# Patient Record
Sex: FEMALE | Race: WHITE | Employment: FULL TIME | URBAN - METROPOLITAN AREA
[De-identification: names, ages, dates, MRNs, and addresses within clinical notes are randomized per-mention and may not be internally consistent; named-entity substitution may affect disease eponyms.]

---

## 2019-10-10 ENCOUNTER — OFFICE VISIT (OUTPATIENT)
Dept: ENT CLINIC | Age: 49
End: 2019-10-10
Payer: COMMERCIAL

## 2019-10-10 VITALS — HEART RATE: 74 BPM | SYSTOLIC BLOOD PRESSURE: 118 MMHG | OXYGEN SATURATION: 99 % | DIASTOLIC BLOOD PRESSURE: 68 MMHG

## 2019-10-10 DIAGNOSIS — R06.83 SNORING: Primary | ICD-10-CM

## 2019-10-10 DIAGNOSIS — R13.12 OROPHARYNGEAL DYSPHAGIA: ICD-10-CM

## 2019-10-10 DIAGNOSIS — H60.8X3 CHRONIC ECZEMATOUS OTITIS EXTERNA OF BOTH EARS: ICD-10-CM

## 2019-10-10 PROCEDURE — 99203 OFFICE O/P NEW LOW 30 MIN: CPT | Performed by: OTOLARYNGOLOGY

## 2019-10-10 RX ORDER — CITALOPRAM 40 MG/1
TABLET ORAL
Refills: 3 | COMMUNITY
Start: 2019-09-03

## 2019-10-10 RX ORDER — OMEPRAZOLE 10 MG/1
10 CAPSULE, DELAYED RELEASE ORAL PRN
COMMUNITY

## 2019-10-10 RX ORDER — BUPROPION HYDROCHLORIDE 150 MG/1
TABLET, EXTENDED RELEASE ORAL
Refills: 3 | COMMUNITY
Start: 2019-09-03

## 2019-10-10 RX ORDER — LOSARTAN POTASSIUM 50 MG/1
TABLET ORAL
Refills: 3 | COMMUNITY
Start: 2019-09-05

## 2022-10-03 ENCOUNTER — ANESTHESIA EVENT (OUTPATIENT)
Dept: OPERATING ROOM | Age: 52
End: 2022-10-03
Payer: COMMERCIAL

## 2022-10-03 RX ORDER — ESTRADIOL 0.5 MG/1
1 TABLET ORAL DAILY
COMMUNITY

## 2022-10-03 RX ORDER — CARVEDILOL 6.25 MG/1
6.25 TABLET ORAL 2 TIMES DAILY WITH MEALS
COMMUNITY

## 2022-10-03 NOTE — PROGRESS NOTES
Name_______________________________________Printed:____________________  Date and time of surgery____10/6/22  0730  [facility]____________________Arrival Time:____0600____________   1. The instructions given regarding when and if a patient needs to stop oral intake prior to surgery varies. Follow the specific instructions you were given                  _x__Nothing to eat or to drink after Midnight the night before.                   ____Carbo loading or ERAS instructions will be given to select patients-if you have been given those instructions -please do the following                           The evening before your surgery after dinner before midnight drink 40 ounces of gatorade. If you are diabetic use sugar free. The morning of surgery drink 40 ounces of water. This needs to be finished 3 hours prior to your surgery start time. 2. Take the following pills with a small sip of water on the morning of surgery: carvedilol, bupropion, citalopram                  Do not take blood pressure medications ending in pril or sartan the khurram prior to surgery or the morning of surgery_   3. Aspirin, Ibuprofen, Advil, Naproxen, Vitamin E and other Anti-inflammatory products and supplements should be stopped for 5 -7days before surgery or as directed by your physician. 4. Check with your Doctor regarding stopping Plavix, Coumadin,Eliquis, Lovenox,Effient,Pradaxa,Xarelto, Fragmin or other blood thinners and follow their instructions. 5. Do not smoke, and do not drink any alcoholic beverages 24 hours prior to surgery. This includes NA Beer. Refrain from the usage of any recreational drugs. 6. You may brush your teeth and gargle the morning of surgery. DO NOT SWALLOW WATER   7. You MUST make arrangements for a responsible adult to stay on site while you are here and take you home after your surgery. You will not be allowed to leave alone or drive yourself home. It is strongly suggested someone stay with you the first 24 hrs. Your surgery will be cancelled if you do not have a ride home. 8. A parent/legal guardian must accompany a child scheduled for surgery and plan to stay at the hospital until the child is discharged. Please do not bring other children with you. 9. Please wear simple, loose fitting clothing to the hospital.  Benjamine Si not bring valuables (money, credit cards, checkbooks, etc.) Do not wear any makeup (including no eye makeup) or nail polish on your fingers or toes. 10. DO NOT wear any jewelry or piercings on day of surgery. All body piercing jewelry must be removed. 11. If you have ___dentures, they will be removed before going to the OR; we will provide you a container. If you wear ___contact lenses or __x_glasses, they will be removed; please bring a case for them. 12. Please see your family doctor/pediatrician for a history & physical and/or concerning medications. Bring any test results/reports from your physician's office. PCP__________________Phone___________H&P Appt. Date________             13 If you  have a Living Will and Durable Power of  for Healthcare, please bring in a copy. 15. Notify your Surgeon if you develop any illness between now and surgery  time, cough, cold, fever, sore throat, nausea, vomiting, etc.  Please notify your surgeon if you experience dizziness, shortness of breath or blurred vision between now & the time of your surgery             15. DO NOT shave your operative site 96 hours prior to surgery. For face & neck surgery, men may use an electric razor 48 hours prior to surgery. 16. Shower the night before or morning of surgery using an antibacterial soap or as you have been instructed. 17. To provide excellent care visitors will be limited to one in the room at any given time. 18.  Please bring picture ID and insurance card.              19.  Visit our web site for additional information: [email or web address]              20.During flu season no children under the age of 15 are permitted in the hospital for the safety of all patients. 21. If you take a long acting insulin in the evening only  take half of your usual  dose the night  before your procedure              22. If you use a c-pap please bring DOS if staying overnight,             23.For your convenience [facility] has a pharmacy on site to fill your prescriptions. 24. If you use oxygen and have a portable tank please bring it  with you the DOS             25. Bring a complete list of all your medications with name and dose include any supplements. 26. Other__________________________________________   *Please call pre admission testing if you any further questions   [address]    [place] [address]. [facility]  [phone]   [address] [place] [address]       VISITOR POLICY(subject to change)    Current policy is 2 visitors per patient. No children. A mask is required. Visiting hours are 8a-8p. Overnight visitors will be at the discretion of the nurse. All above information reviewed with patient in person or by phone. Patient verbalizes understanding. All questions and concerns addressed.                                                                                                  Patient/Rep___patient_________________                                                                                                                                    PRE OP INSTRUCTIONS

## 2022-10-03 NOTE — PROGRESS NOTES
Case reviewed with Dr. Tyree Roldan and she requested cardiac clearance for patient. Called and spoke with Bob Vasquez at Dr. Victor M Fulton office and informed her of the need for cardiac clearance. She stated she would call the patient.

## 2022-10-05 NOTE — PROGRESS NOTES
MUNIRA harris at Dr. Clement Lewis office. She stated that the patient is at the cardiologist appointment [facility] and she will be calling the patient around 1430. She will then call us and let us know the outcome of the cardiology visit.

## 2022-10-06 ENCOUNTER — HOSPITAL ENCOUNTER (OUTPATIENT)
Age: 52
Setting detail: OUTPATIENT SURGERY
Discharge: HOME | End: 2022-10-06
Attending: PODIATRIST | Admitting: PODIATRIST
Payer: COMMERCIAL

## 2022-10-06 ENCOUNTER — ANESTHESIA (OUTPATIENT)
Dept: OPERATING ROOM | Age: 52
End: 2022-10-06
Payer: COMMERCIAL

## 2022-10-06 VITALS
TEMPERATURE: 98.1 F | HEIGHT: 61 IN | HEART RATE: 90 BPM | RESPIRATION RATE: 12 BRPM | BODY MASS INDEX: 38.33 KG/M2 | DIASTOLIC BLOOD PRESSURE: 92 MMHG | WEIGHT: 203 LBS | SYSTOLIC BLOOD PRESSURE: 141 MMHG | OXYGEN SATURATION: 93 %

## 2022-10-06 PROCEDURE — 3600000004 HC SURGERY LEVEL 4 BASE: Performed by: PODIATRIST

## 2022-10-06 PROCEDURE — 7100000000 HC PACU RECOVERY - FIRST 15 MIN: Performed by: PODIATRIST

## 2022-10-06 PROCEDURE — 6360000002 HC RX W HCPCS: Performed by: NURSE ANESTHETIST, CERTIFIED REGISTERED

## 2022-10-06 PROCEDURE — 64447 NJX AA&/STRD FEMORAL NRV IMG: CPT | Performed by: ANESTHESIOLOGY

## 2022-10-06 PROCEDURE — 2500000003 HC RX 250 WO HCPCS: Performed by: NURSE ANESTHETIST, CERTIFIED REGISTERED

## 2022-10-06 PROCEDURE — 6370000000 HC RX 637 (ALT 250 FOR IP): Performed by: ANESTHESIOLOGY

## 2022-10-06 PROCEDURE — 6360000002 HC RX W HCPCS: Performed by: ANESTHESIOLOGY

## 2022-10-06 PROCEDURE — 3600000014 HC SURGERY LEVEL 4 ADDTL 15MIN: Performed by: PODIATRIST

## 2022-10-06 PROCEDURE — 7100000010 HC PHASE II RECOVERY - FIRST 15 MIN: Performed by: PODIATRIST

## 2022-10-06 PROCEDURE — C1889 IMPLANT/INSERT DEVICE, NOC: HCPCS | Performed by: PODIATRIST

## 2022-10-06 PROCEDURE — C1769 GUIDE WIRE: HCPCS | Performed by: PODIATRIST

## 2022-10-06 PROCEDURE — 6360000002 HC RX W HCPCS

## 2022-10-06 PROCEDURE — C1713 ANCHOR/SCREW BN/BN,TIS/BN: HCPCS | Performed by: PODIATRIST

## 2022-10-06 PROCEDURE — 2720000010 HC SURG SUPPLY STERILE: Performed by: PODIATRIST

## 2022-10-06 PROCEDURE — 2580000003 HC RX 258: Performed by: NURSE ANESTHETIST, CERTIFIED REGISTERED

## 2022-10-06 PROCEDURE — C1776 JOINT DEVICE (IMPLANTABLE): HCPCS | Performed by: PODIATRIST

## 2022-10-06 PROCEDURE — 64445 NJX AA&/STRD SCIATIC NRV IMG: CPT | Performed by: ANESTHESIOLOGY

## 2022-10-06 PROCEDURE — 6370000000 HC RX 637 (ALT 250 FOR IP): Performed by: PODIATRIST

## 2022-10-06 PROCEDURE — 2580000003 HC RX 258: Performed by: PODIATRIST

## 2022-10-06 PROCEDURE — 2709999900 HC NON-CHARGEABLE SUPPLY: Performed by: PODIATRIST

## 2022-10-06 PROCEDURE — 3700000000 HC ANESTHESIA ATTENDED CARE: Performed by: PODIATRIST

## 2022-10-06 PROCEDURE — 7100000001 HC PACU RECOVERY - ADDTL 15 MIN: Performed by: PODIATRIST

## 2022-10-06 PROCEDURE — 3700000001 HC ADD 15 MINUTES (ANESTHESIA): Performed by: PODIATRIST

## 2022-10-06 PROCEDURE — 6360000002 HC RX W HCPCS: Performed by: PODIATRIST

## 2022-10-06 PROCEDURE — 2500000003 HC RX 250 WO HCPCS: Performed by: ANESTHESIOLOGY

## 2022-10-06 PROCEDURE — 7100000011 HC PHASE II RECOVERY - ADDTL 15 MIN: Performed by: PODIATRIST

## 2022-10-06 DEVICE — POLYAXIAL LOCKING PLATE -  LAPIDUS CROSS-PLATE, RIGHT (T10)
Type: IMPLANTABLE DEVICE | Site: FOOT | Status: FUNCTIONAL
Brand: ANCHORAGE

## 2022-10-06 DEVICE — SONICANCHOR KIT
Type: IMPLANTABLE DEVICE | Site: ANKLE | Status: FUNCTIONAL
Brand: SONICANCHOR

## 2022-10-06 DEVICE — LOCKING SCREW
Type: IMPLANTABLE DEVICE | Site: ANKLE | Status: FUNCTIONAL
Brand: VARIAX

## 2022-10-06 DEVICE — CANNULATED SCREW - 16MM THREAD
Type: IMPLANTABLE DEVICE | Site: FOOT | Status: FUNCTIONAL
Brand: AXSOS 3

## 2022-10-06 DEVICE — HAMMERTOE IMPLANT, SMALL
Type: IMPLANTABLE DEVICE | Site: FOOT | Status: FUNCTIONAL
Brand: TOETAC

## 2022-10-06 DEVICE — GRAFT HUM TISS W2XL4CM THK0.1-0.2MM AMNIO MEM 2 LAYR: Type: IMPLANTABLE DEVICE | Site: FOOT | Status: FUNCTIONAL

## 2022-10-06 DEVICE — IMPLANTABLE DEVICE
Type: IMPLANTABLE DEVICE | Site: ANKLE | Status: FUNCTIONAL
Brand: GRAVITY

## 2022-10-06 DEVICE — INJECTABLE KIT
Type: IMPLANTABLE DEVICE | Site: ANKLE | Status: FUNCTIONAL
Brand: AUGMENT® INJECTABLE

## 2022-10-06 DEVICE — BIOACTIVE BONE GRAFT SUBSTITUTE, FOAM PACK; BETA-TRICALCIUM PHOSPHATE, TYPE I BOVINE COLLAGEN, AND BIOACTIVE GLASS
Type: IMPLANTABLE DEVICE | Site: ANKLE | Status: FUNCTIONAL
Brand: VITOSS BBTRAUMA

## 2022-10-06 DEVICE — CP LAG SCREW (T10)
Type: IMPLANTABLE DEVICE | Site: FOOT | Status: FUNCTIONAL
Brand: ANCHORAGE

## 2022-10-06 RX ORDER — HYDROMORPHONE HCL 110MG/55ML
PATIENT CONTROLLED ANALGESIA SYRINGE INTRAVENOUS PRN
Status: DISCONTINUED | OUTPATIENT
Start: 2022-10-06 | End: 2022-10-06 | Stop reason: SDUPTHER

## 2022-10-06 RX ORDER — LIDOCAINE HYDROCHLORIDE 10 MG/ML
0.5 INJECTION, SOLUTION EPIDURAL; INFILTRATION; INTRACAUDAL; PERINEURAL ONCE
Status: DISCONTINUED | OUTPATIENT
Start: 2022-10-06 | End: 2022-10-06 | Stop reason: HOSPADM

## 2022-10-06 RX ORDER — ROCURONIUM BROMIDE 10 MG/ML
INJECTION, SOLUTION INTRAVENOUS PRN
Status: DISCONTINUED | OUTPATIENT
Start: 2022-10-06 | End: 2022-10-06 | Stop reason: SDUPTHER

## 2022-10-06 RX ORDER — SUCCINYLCHOLINE/SOD CL,ISO/PF 200MG/10ML
SYRINGE (ML) INTRAVENOUS PRN
Status: DISCONTINUED | OUTPATIENT
Start: 2022-10-06 | End: 2022-10-06 | Stop reason: SDUPTHER

## 2022-10-06 RX ORDER — BUPIVACAINE HYDROCHLORIDE 5 MG/ML
INJECTION, SOLUTION EPIDURAL; INTRACAUDAL
Status: COMPLETED | OUTPATIENT
Start: 2022-10-06 | End: 2022-10-06

## 2022-10-06 RX ORDER — OXYCODONE HYDROCHLORIDE 5 MG/1
5 TABLET ORAL
Status: COMPLETED | OUTPATIENT
Start: 2022-10-06 | End: 2022-10-06

## 2022-10-06 RX ORDER — MIDAZOLAM HYDROCHLORIDE 1 MG/ML
INJECTION INTRAMUSCULAR; INTRAVENOUS PRN
Status: DISCONTINUED | OUTPATIENT
Start: 2022-10-06 | End: 2022-10-06 | Stop reason: SDUPTHER

## 2022-10-06 RX ORDER — PROPOFOL 10 MG/ML
INJECTION, EMULSION INTRAVENOUS PRN
Status: DISCONTINUED | OUTPATIENT
Start: 2022-10-06 | End: 2022-10-06 | Stop reason: SDUPTHER

## 2022-10-06 RX ORDER — EPHEDRINE SULFATE/0.9% NACL/PF 50 MG/5 ML
SYRINGE (ML) INTRAVENOUS PRN
Status: DISCONTINUED | OUTPATIENT
Start: 2022-10-06 | End: 2022-10-06 | Stop reason: SDUPTHER

## 2022-10-06 RX ORDER — PROCHLORPERAZINE EDISYLATE 5 MG/ML
5 INJECTION INTRAMUSCULAR; INTRAVENOUS
Status: DISCONTINUED | OUTPATIENT
Start: 2022-10-06 | End: 2022-10-06 | Stop reason: HOSPADM

## 2022-10-06 RX ORDER — BUPIVACAINE HYDROCHLORIDE AND EPINEPHRINE 5; 5 MG/ML; UG/ML
INJECTION, SOLUTION PERINEURAL
Status: COMPLETED | OUTPATIENT
Start: 2022-10-06 | End: 2022-10-06

## 2022-10-06 RX ORDER — ONDANSETRON 2 MG/ML
4 INJECTION INTRAMUSCULAR; INTRAVENOUS
Status: DISCONTINUED | OUTPATIENT
Start: 2022-10-06 | End: 2022-10-06 | Stop reason: HOSPADM

## 2022-10-06 RX ORDER — CEFAZOLIN SODIUM 1 G/3ML
INJECTION, POWDER, FOR SOLUTION INTRAMUSCULAR; INTRAVENOUS PRN
Status: DISCONTINUED | OUTPATIENT
Start: 2022-10-06 | End: 2022-10-06 | Stop reason: SDUPTHER

## 2022-10-06 RX ORDER — MIDAZOLAM HYDROCHLORIDE 2 MG/2ML
2 INJECTION, SOLUTION INTRAMUSCULAR; INTRAVENOUS ONCE
Status: COMPLETED | OUTPATIENT
Start: 2022-10-06 | End: 2022-10-06

## 2022-10-06 RX ORDER — FENTANYL CITRATE 50 UG/ML
25 INJECTION, SOLUTION INTRAMUSCULAR; INTRAVENOUS EVERY 5 MIN PRN
Status: DISCONTINUED | OUTPATIENT
Start: 2022-10-06 | End: 2022-10-06 | Stop reason: HOSPADM

## 2022-10-06 RX ORDER — SODIUM CHLORIDE, SODIUM LACTATE, POTASSIUM CHLORIDE, CALCIUM CHLORIDE 600; 310; 30; 20 MG/100ML; MG/100ML; MG/100ML; MG/100ML
INJECTION, SOLUTION INTRAVENOUS CONTINUOUS
Status: DISCONTINUED | OUTPATIENT
Start: 2022-10-06 | End: 2022-10-06 | Stop reason: HOSPADM

## 2022-10-06 RX ORDER — ONDANSETRON 2 MG/ML
INJECTION INTRAMUSCULAR; INTRAVENOUS PRN
Status: DISCONTINUED | OUTPATIENT
Start: 2022-10-06 | End: 2022-10-06 | Stop reason: SDUPTHER

## 2022-10-06 RX ORDER — SCOLOPAMINE TRANSDERMAL SYSTEM 1 MG/1
1 PATCH, EXTENDED RELEASE TRANSDERMAL ONCE
Status: DISCONTINUED | OUTPATIENT
Start: 2022-10-06 | End: 2022-10-06 | Stop reason: HOSPADM

## 2022-10-06 RX ORDER — LABETALOL HYDROCHLORIDE 5 MG/ML
10 INJECTION, SOLUTION INTRAVENOUS
Status: DISCONTINUED | OUTPATIENT
Start: 2022-10-06 | End: 2022-10-06 | Stop reason: HOSPADM

## 2022-10-06 RX ORDER — SODIUM CHLORIDE 9 MG/ML
INJECTION, SOLUTION INTRAVENOUS CONTINUOUS
Status: DISCONTINUED | OUTPATIENT
Start: 2022-10-06 | End: 2022-10-06 | Stop reason: HOSPADM

## 2022-10-06 RX ORDER — BACITRACIN ZINC AND POLYMYXIN B SULFATE 500; 1000 [USP'U]/G; [USP'U]/G
OINTMENT TOPICAL
Status: COMPLETED | OUTPATIENT
Start: 2022-10-06 | End: 2022-10-06

## 2022-10-06 RX ORDER — FENTANYL CITRATE 50 UG/ML
100 INJECTION, SOLUTION INTRAMUSCULAR; INTRAVENOUS ONCE
Status: COMPLETED | OUTPATIENT
Start: 2022-10-06 | End: 2022-10-06

## 2022-10-06 RX ORDER — HYDRALAZINE HYDROCHLORIDE 20 MG/ML
10 INJECTION INTRAMUSCULAR; INTRAVENOUS
Status: DISCONTINUED | OUTPATIENT
Start: 2022-10-06 | End: 2022-10-06 | Stop reason: HOSPADM

## 2022-10-06 RX ORDER — TETRACAINE HYDROCHLORIDE 5 MG/ML
2 SOLUTION OPHTHALMIC ONCE
Status: COMPLETED | OUTPATIENT
Start: 2022-10-06 | End: 2022-10-06

## 2022-10-06 RX ORDER — DEXAMETHASONE SODIUM PHOSPHATE 4 MG/ML
INJECTION, SOLUTION INTRA-ARTICULAR; INTRALESIONAL; INTRAMUSCULAR; INTRAVENOUS; SOFT TISSUE PRN
Status: DISCONTINUED | OUTPATIENT
Start: 2022-10-06 | End: 2022-10-06 | Stop reason: SDUPTHER

## 2022-10-06 RX ORDER — FENTANYL CITRATE 50 UG/ML
INJECTION, SOLUTION INTRAMUSCULAR; INTRAVENOUS
Status: COMPLETED
Start: 2022-10-06 | End: 2022-10-06

## 2022-10-06 RX ORDER — SODIUM CHLORIDE, SODIUM LACTATE, POTASSIUM CHLORIDE, CALCIUM CHLORIDE 600; 310; 30; 20 MG/100ML; MG/100ML; MG/100ML; MG/100ML
INJECTION, SOLUTION INTRAVENOUS CONTINUOUS PRN
Status: DISCONTINUED | OUTPATIENT
Start: 2022-10-06 | End: 2022-10-06 | Stop reason: SDUPTHER

## 2022-10-06 RX ORDER — HYDROMORPHONE HCL 110MG/55ML
0.5 PATIENT CONTROLLED ANALGESIA SYRINGE INTRAVENOUS EVERY 5 MIN PRN
Status: DISCONTINUED | OUTPATIENT
Start: 2022-10-06 | End: 2022-10-06 | Stop reason: HOSPADM

## 2022-10-06 RX ORDER — FENTANYL CITRATE 50 UG/ML
INJECTION, SOLUTION INTRAMUSCULAR; INTRAVENOUS PRN
Status: DISCONTINUED | OUTPATIENT
Start: 2022-10-06 | End: 2022-10-06 | Stop reason: SDUPTHER

## 2022-10-06 RX ORDER — MINERAL OIL AND WHITE PETROLATUM 150; 830 MG/G; MG/G
OINTMENT OPHTHALMIC PRN
Status: DISCONTINUED | OUTPATIENT
Start: 2022-10-06 | End: 2022-10-06 | Stop reason: HOSPADM

## 2022-10-06 RX ORDER — LIDOCAINE HYDROCHLORIDE 10 MG/ML
1 INJECTION, SOLUTION EPIDURAL; INFILTRATION; INTRACAUDAL; PERINEURAL
Status: DISCONTINUED | OUTPATIENT
Start: 2022-10-06 | End: 2022-10-06 | Stop reason: HOSPADM

## 2022-10-06 RX ORDER — POLYVINYL ALCOHOL 14 MG/ML
1 SOLUTION/ DROPS OPHTHALMIC PRN
Status: DISCONTINUED | OUTPATIENT
Start: 2022-10-06 | End: 2022-10-06 | Stop reason: HOSPADM

## 2022-10-06 RX ADMIN — SODIUM CHLORIDE, POTASSIUM CHLORIDE, SODIUM LACTATE AND CALCIUM CHLORIDE: 600; 310; 30; 20 INJECTION, SOLUTION INTRAVENOUS at 07:14

## 2022-10-06 RX ADMIN — HYDROMORPHONE HYDROCHLORIDE 0.5 MG: 2 INJECTION, SOLUTION INTRAMUSCULAR; INTRAVENOUS; SUBCUTANEOUS at 12:08

## 2022-10-06 RX ADMIN — CEFAZOLIN 2 G: 1 INJECTION, POWDER, FOR SOLUTION INTRAVENOUS at 11:42

## 2022-10-06 RX ADMIN — PROPOFOL 200 MG: 10 INJECTION, EMULSION INTRAVENOUS at 07:56

## 2022-10-06 RX ADMIN — FENTANYL CITRATE 50 MCG: 50 INJECTION, SOLUTION INTRAMUSCULAR; INTRAVENOUS at 07:34

## 2022-10-06 RX ADMIN — SUGAMMADEX 200 MG: 100 INJECTION, SOLUTION INTRAVENOUS at 11:57

## 2022-10-06 RX ADMIN — SODIUM CHLORIDE, POTASSIUM CHLORIDE, SODIUM LACTATE AND CALCIUM CHLORIDE: 600; 310; 30; 20 INJECTION, SOLUTION INTRAVENOUS at 07:10

## 2022-10-06 RX ADMIN — BUPIVACAINE HYDROCHLORIDE 20 ML: 5 INJECTION, SOLUTION EPIDURAL; INTRACAUDAL at 07:27

## 2022-10-06 RX ADMIN — PROPOFOL 50 MG: 10 INJECTION, EMULSION INTRAVENOUS at 12:40

## 2022-10-06 RX ADMIN — DEXAMETHASONE SODIUM PHOSPHATE 8 MG: 4 INJECTION, SOLUTION INTRAMUSCULAR; INTRAVENOUS at 07:56

## 2022-10-06 RX ADMIN — MIDAZOLAM HYDROCHLORIDE 1 MG: 1 INJECTION, SOLUTION INTRAMUSCULAR; INTRAVENOUS at 07:15

## 2022-10-06 RX ADMIN — HYDROMORPHONE HYDROCHLORIDE 0.5 MG: 2 INJECTION, SOLUTION INTRAMUSCULAR; INTRAVENOUS; SUBCUTANEOUS at 13:10

## 2022-10-06 RX ADMIN — ONDANSETRON 4 MG: 2 INJECTION INTRAMUSCULAR; INTRAVENOUS at 07:56

## 2022-10-06 RX ADMIN — ONDANSETRON 4 MG: 2 INJECTION INTRAMUSCULAR; INTRAVENOUS at 12:45

## 2022-10-06 RX ADMIN — Medication 120 MG: at 07:56

## 2022-10-06 RX ADMIN — ROCURONIUM BROMIDE 30 MG: 10 INJECTION, SOLUTION INTRAVENOUS at 08:47

## 2022-10-06 RX ADMIN — FENTANYL CITRATE 25 MCG: 0.05 INJECTION, SOLUTION INTRAMUSCULAR; INTRAVENOUS at 13:42

## 2022-10-06 RX ADMIN — FENTANYL CITRATE 50 MCG: 50 INJECTION, SOLUTION INTRAMUSCULAR; INTRAVENOUS at 07:55

## 2022-10-06 RX ADMIN — Medication 10 MG: at 08:19

## 2022-10-06 RX ADMIN — PROPOFOL 50 MG: 10 INJECTION, EMULSION INTRAVENOUS at 08:47

## 2022-10-06 RX ADMIN — MIDAZOLAM 1 MG: 1 INJECTION INTRAMUSCULAR; INTRAVENOUS at 07:52

## 2022-10-06 RX ADMIN — HYDROMORPHONE HYDROCHLORIDE 0.5 MG: 2 INJECTION, SOLUTION INTRAMUSCULAR; INTRAVENOUS; SUBCUTANEOUS at 12:30

## 2022-10-06 RX ADMIN — ROCURONIUM BROMIDE 20 MG: 10 INJECTION, SOLUTION INTRAVENOUS at 09:20

## 2022-10-06 RX ADMIN — HYDROMORPHONE HYDROCHLORIDE 0.5 MG: 2 INJECTION, SOLUTION INTRAMUSCULAR; INTRAVENOUS; SUBCUTANEOUS at 12:52

## 2022-10-06 RX ADMIN — FENTANYL CITRATE 50 MCG: 50 INJECTION, SOLUTION INTRAMUSCULAR; INTRAVENOUS at 08:58

## 2022-10-06 RX ADMIN — FENTANYL CITRATE 25 MCG: 0.05 INJECTION, SOLUTION INTRAMUSCULAR; INTRAVENOUS at 13:52

## 2022-10-06 RX ADMIN — BUPIVACAINE HYDROCHLORIDE 20 ML: 5 INJECTION, SOLUTION EPIDURAL; INTRACAUDAL at 07:22

## 2022-10-06 RX ADMIN — OXYCODONE 5 MG: 5 TABLET ORAL at 14:14

## 2022-10-06 RX ADMIN — MIDAZOLAM 1 MG: 1 INJECTION INTRAMUSCULAR; INTRAVENOUS at 07:49

## 2022-10-06 RX ADMIN — TETRACAINE HYDROCHLORIDE 2 DROP: 5 SOLUTION OPHTHALMIC at 13:59

## 2022-10-06 RX ADMIN — CEFAZOLIN 2000 MG: 2 INJECTION, POWDER, FOR SOLUTION INTRAMUSCULAR; INTRAVENOUS at 07:42

## 2022-10-06 ASSESSMENT — PAIN DESCRIPTION - DESCRIPTORS
DESCRIPTORS: ACHING

## 2022-10-06 ASSESSMENT — PAIN DESCRIPTION - LOCATION
LOCATION: FOOT

## 2022-10-06 ASSESSMENT — PAIN DESCRIPTION - ORIENTATION
ORIENTATION: RIGHT

## 2022-10-06 ASSESSMENT — PAIN SCALES - GENERAL
PAINLEVEL_OUTOF10: 0
PAINLEVEL_OUTOF10: 3
PAINLEVEL_OUTOF10: 8
PAINLEVEL_OUTOF10: 0
PAINLEVEL_OUTOF10: 7
PAINLEVEL_OUTOF10: 6

## 2022-10-06 ASSESSMENT — ENCOUNTER SYMPTOMS: SHORTNESS OF BREATH: 0

## 2022-10-06 ASSESSMENT — PAIN - FUNCTIONAL ASSESSMENT
PAIN_FUNCTIONAL_ASSESSMENT: ACTIVITIES ARE NOT PREVENTED
PAIN_FUNCTIONAL_ASSESSMENT: 0-10

## 2022-10-06 NOTE — PROGRESS NOTES
Pt has c/o right eye pain/watering.   Dr. Verner Dilling at bedside to assess right eye-orders received/initiated

## 2022-10-06 NOTE — BRIEF OP NOTE
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]; Posterior tibial tendinitis of right leg [M76.821]; Hallux valgus, right [M20.11]; Metatarsalgia, right foot [M77.41]; Dislocation Metatarsophalangeal joint- 2nd digit, right foot [S93.124D]; Contracture of Joint- right foot [M24.574]; Hammertoe- 2nd digit, right foot [M20.42]    Post-Op Diagnosis: Same       Procedure(s):  39981- Common Peroneal Neuroplasty- right ankle/foot;  44551- Subtalar Joint Arthrodesis- right ankle/foot;  84356- Weil Osteotomy- right ankle/foot;  22921- Kidner Procedure- right ankle/foot;  43686- Lapidus- right foot;  54283- Open Repair of Closed Dislocation of Metatarsophalangeal joint- 2nd digit, right foot;  62232- Hammertoe Correction- 2nd digit, right foot;  62986- Flexor Tenotomy of the Distal Interphalangeal Joint- 3rd digit, right foot  61447- Application Below Knee Splint- right lower limb    Surgeon(s):  Jesus Hamilton DPM    Assistant:  Jyoti Zeng DPM, PGY-3  Jose Eduardo Chinchilla, MS-4    Anesthesia: General with popliteal/saphenous nerve block    Hemostasis: Anatomic dissection and electrocautery    Injectables: 10 cc of 0.5% marcaine with epinephrine preoperatively    Materials: 3-0 vicryl, 4-0 vicryl, 5-0 vicryl, 3-0 nylon, 4-0 nylon, 2-0 Xbraid suture    Implants:   Code Fever Axsos 3  6.5x85mm cannulated screw x1  6.5x80mm cannulated screw x1  [employer] [facility] [place] 2 CP  Lapidus CP plate right x1  5.3E29MB locking screw x2  3.5x12mm locking screw x2  4. 1x40mm lag screw x1  Code Fever [place] Edge Screw 2x12mm x1  [place] [facility] Sonic [place] 2.5x10mm x1  [facility] [place] [facility] [place] Plantar plate repair set x1  [employer] Toe Tac Xpress Small x1  [facility] AlloWrap DS Wet 2x4cm x1  [employer] Augment injectable   [employer] Vitoss Bone substitute     Estimated Blood Loss (mL): 716     Complications: None    Specimens:   * No specimens in log *    Implants:  Implant Name Type Inv.  Item Serial No.  Lot No. LRB No. Used Action   GRAFT BNE SUB 5CC B TRICALCIUM PHSPTE VERSATILE ULT POR - [id number]  GRAFT BNE SUB 5CC B TRICALCIUM PHSPTE VERSATILE ULT POR  [employer] [facility] [id number] Right 1 Implanted   SCREW BNE DIA3MM PLNTR GRAV - [id number]  SCREW BNE DIA3MM PLNTR [facility] [employer] [facility] [facility] [id number] Right 1 Implanted   ANCHOR KIT 2.5X10 MM FORBerst FIBER SONIC - [id number]  ANCHOR KIT 2.5X10 MM FORC FIBER SONIC  [email or web address] [facility]- [id number] Right 2 Implanted   GRAFT BONE TIB INJ 1.5CC ALLOGRFT AUGMENT - [id number]  GRAFT BONE TIB INJ 1.5CC ALLOGRFT AUGMENT  [facility] [id number] Right 1 Implanted   GRAFT BNE SUB 3CC RHPDGF BB B TRICALCIUM PHSPTE RADPQ AUG - [id number]  GRAFT BNE SUB 3CC RHPDGF BB B TRICALCIUM PHSPTE RADPQ AUG  [facility] [employer] [facility] [id number] Right 1 Implanted   GRAFT BNE SUB 3CC RHPDGF BB B TRICALCIUM PHSPTE RADPQ AUG - [id number]  GRAFT BNE SUB 3CC RHPDGF BB B TRICALCIUM PHSPTE RADPQ AUG  [facility] [employer] [facility] [id number] Right 1 Implanted   GRAFT BNE SUB 3CC RHPDGF BB B TRICALCIUM PHSPTE RADPQ AUG - [id number]  GRAFT BNE SUB 3CC RHPDGF BB B TRICALCIUM PHSPTE RADPQ AUG  [facility] [id number] Right 1 Implanted   IMPLANT TOE JT SM FOR HAMRTOE FIX SYS TOETAC - [id number]  IMPLANT TOE JT SM FOR HAMRTOE FIX SYS TOETAC  [employer] [facility] [id number] Right 1 Implanted   DARTFIRE EDGE SCREW    [employer]_COV [id number] Right 1 Implanted   SCREW BNE LAXMI 6.5X80 MM 16 MM THRD AXSOS 3 - [id number]  SCREW BNE LAXMI 6.5X80 MM 16 MM THRD AXSOS 3  [employer] HOWM-WD  Right 1 Implanted   SCREW BNE LAXMI 6.5X85 MM 16 MM THRD AXSOS 3 - [id number]  SCREW BNE LAXMI 6.5X85 MM 16 MM THRD AXSOS 3  [employer] [facility]  Right 1 Implanted   PLATE BNE R LAPIDUS CROSSPLT POLYAX YURIDIA T10 ANCHORAGE 2 - [id number]  PLATE BNE R LAPIDUS CROSSPLT POLYAX YURIDIA T10 ANCHORAGE 2  [employer] [facility]  Right 1 Implanted   GRAFT HUM TISS [id number] THK0.1-0.2MM AMNIO MEM 2 LAYR - [id number]  GRAFT HUM TISS N1ID2ON THK0.1-0.2MM AMNIO MEM 2 LAYR  [employer] [facility] [id number] Right 1 Implanted   SCREW CP LAG 4.1X40MM T10 - [id number]  SCREW CP LAG 4.1X40MM T10  [employer] [facility]  Right 1 Implanted   PLATE BNE R LAPIDUS CROSSPLT POLYAX YURIDIA T10 ANCHORAGE 2 - [id number]  PLATE BNE R LAPIDUS CROSSPLT POLYAX YURIDIA T10 ANCHORAGE 2  [employer] [facility]  Right 1 Implanted   SCREW BNE L12MM DIA3.5MM CANC TI YURIDIA FULL THRD T10 DRV FOR - [id number]  SCREW BNE L12MM DIA3.5MM CANC TI YURIDIA FULL THRD T10 DRV FOR  [employer] [facility]  Right 2 Implanted   SCREW BNE L16MM DIA3.5MM SNOW TI YURIDIA FULL THRD T10 DRV FOR - [id number]  SCREW BNE L16MM DIA3.5MM SNOW TI YURIDIA FULL THRD T10 DRV FOR  [employer] [facility]  Right 2 Implanted   DARTFIRE EDGE SCREW    [employer]_COV [id number] Right 1 Implanted         Drains: * No LDAs found *    Findings: see op report    Electronically signed by Sumanth Ramos DPM on 10/6/2022 at 1:05 PM

## 2022-10-06 NOTE — PROGRESS NOTES
Discharge instructions review with patient and daughter. All home medications have been reviewed, pt v/u. Discharge instructions signed. Pt discharged via wheelchair. Pt discharged with discharge paperwork, ice pack, xerelto prescription and all belongings. Daughter taking stable pt home.

## 2022-10-06 NOTE — OP NOTE
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]; Posterior tibial tendinitis of right leg [M76.821]; Hallux valgus, right [M20.11]; Metatarsalgia, right foot [M77.41]; Dislocation Metatarsophalangeal joint- 2nd digit, right foot [S93.124D]; Contracture of Joint- right foot [M24.574]; Hammertoe- 2nd digit, right foot [M20.42]     Post-Op Diagnosis: Same       Procedure(s):  12620- Common Peroneal Neuroplasty- right ankle/foot;  04066- Subtalar Joint Arthrodesis- right ankle/foot;  02843- Weil Osteotomy- right ankle/foot;  35761- Kidner Procedure- right ankle/foot;  41004- Lapidus- right foot;  90778- Open Repair of Closed Dislocation of Metatarsophalangeal joint- 2nd digit, right foot;  88753- Hammertoe Correction- 2nd digit, right foot;  90217- Flexor Tenotomy of the Distal Interphalangeal Joint- 3rd digit, right foot  63658- Application Below Knee Splint- right lower limb     Surgeon(s):  Keon Rosen DPM     Assistant:  Michael Kelley DPM, PGY-3  Cheryl Art, MS-4     Anesthesia: General with popliteal/saphenous nerve block     Hemostasis: Anatomic dissection and electrocautery     Injectables: 10 cc of 0.5% marcaine with epinephrine preoperatively     Materials: 3-0 vicryl, 4-0 vicryl, 5-0 vicryl, 3-0 nylon, 4-0 nylon, 2-0 Xbraid suture     Implants:   [employer] Axsos 3  6.5x85mm cannulated screw x1  6.5x80mm cannulated screw x1  [employer] [facility] [place] 2 CP  Lapidus CP plate right x1  8.7S81VZ locking screw x2  3.5x12mm locking screw x2  4. 1x40mm lag screw x1  [employer] Dartfire Edge Screw 2x12mm x1  [facility] Sonic [place] 2.5x10mm x1  [place] [facility] Comerio Plantar plate repair set x1  [place] [facility] Toe Tac Xpress Small x1  [employer] AlloWrap DS Wet 2x4cm x1  [employer]ick[employer] Augment injectable   [employer]ickie [employer] Vitoss Bone substitute      Estimated Blood Loss (mL): 784      Complications: None    Specimens:   * No specimens in log *    Implants:  Implant Name Type Inv.  Item Serial No.  Lot No. LRB No. Used Action   GRAFT BNE SUB 5CC B TRICALCIUM PHSPTE VERSATILE ULT POR - [id number]  GRAFT BNE SUB 5CC B TRICALCIUM PHSPTE VERSATILE ULT POR  [employer] [facility] [id number] Right 1 Implanted   SCREW BNE DIA3MM PLNTR [facility] - [id number]  SCREW BNE DIA3MM PLNTR [facility] [employer] [facility] [id number] Right 1 Implanted   ANCHOR KIT 2.5X10 MM [employer] FIBER SONIC - [id number]  ANCHOR KIT 2.5X10 MM FORC FIBER SONIC  [employer] MARIAH- [id number] Right 2 Implanted   GRAFT BONE TIB INJ 1.5CC ALLOGRFT AUGMENT - [id number]  GRAFT BONE TIB INJ 1.5CC ALLOGRFT AUGMENT  [employer] [facility] [id number] Right 1 Implanted   GRAFT BNE SUB 3CC RHPDGF BB B TRICALCIUM PHSPTE RADPQ AUG - [id number]  GRAFT BNE SUB 3CC RHPDGF BB B TRICALCIUM PHSPTE RADPQ AUG  [facility] [id number] Right 1 Implanted   GRAFT BNE SUB 3CC RHPDGF BB B TRICALCIUM PHSPTE RADPQ AUG - [id number]  GRAFT BNE SUB 3CC RHPDGF BB B TRICALCIUM PHSPTE RADPQ AUG  [facility] [id number] Right 1 Implanted   GRAFT BNE SUB 3CC RHPDGF BB B TRICALCIUM PHSPTE RADPQ AUG - [id number]  GRAFT BNE SUB 3CC RHPDGF BB B TRICALCIUM PHSPTE RADPQ AUG  [facility] [id number] Right 1 Implanted   IMPLANT TOE JT SM FOR HAMRTOE FIX SYS TOETAC - [id number]  IMPLANT TOE JT SM FOR HAMRTOE FIX SYS TOETAC  [employer] [facility] [id number] Right 1 Implanted   DARTFIRE EDGE SCREW    [employer] [id number] Right 1 Implanted   SCREW BNE LAXMI 6.5X80 MM 16 MM THRD AXSOS 3 - [id number]  SCREW BNE LAXMI 6.5X80 MM 16 MM THRD AXSOS 3  [employer] HOWM-WD  Right 1 Implanted   SCREW BNE LAXMI 6.5X85 MM 16 MM THRD AXSOS 3 - [id number]  SCREW BNE LAXMI 6.5X85 MM 16 MM THRD AXSOS 3  [employer] [facility]  Right 1 Implanted   PLATE BNE R LAPIDUS CROSSPLT POLYAX YURIDIA T10 ANCHORAGE 2 - [id number]  PLATE BNE R LAPIDUS CROSSPLT POLYAX YURIDIA T10 ANCHORAGE 2  [employer] [facility]  Right 1 Implanted   GRAFT HUM TISS [id number] THK0.1-0.2MM AMNIO MEM 2 LAYR - [id number]  GRAFT HUM TISS [id number] THK0.1-0.2MM AMNIO MEM 2 LAYR  [employer] [facility] [id number] Right 1 Implanted   SCREW CP LAG 4.1X40MM T10 - [id number]  SCREW CP LAG 4.1X40MM T10  [employer] [facility]  Right 1 Implanted   PLATE BNE R LAPIDUS CROSSPLT POLYAX YURIDIA T10 ANCHORAGE 2 - [id number]  PLATE BNE R LAPIDUS CROSSPLT POLYAX YRUIDIA T10 ANCHORAGE 2  [employer] [facility]  Right 1 Implanted   SCREW BNE L12MM DIA3.5MM CANC TI YURIDIA FULL THRD T10 DRV FOR - [id number]  SCREW BNE L12MM DIA3.5MM CANC TI YURIDIA FULL THRD T10 DRV FOR  [employer] [facility]  Right 2 Implanted   SCREW BNE L16MM DIA3.5MM SNOW TI YURIDIA FULL THRD T10 DRV FOR - [id number]  SCREW BNE L16MM DIA3.5MM SNOW TI YURIDIA FULL THRD T10 DRV FOR  [employer] [facility]  Right 2 Implanted   DARTFIRE EDGE SCREW    [employer]_COV [id number] Right 1 Implanted         Drains: * No LDAs found *    Findings: All deformities were reduced appropriately following completion of the procedure. Iatrogenic rupture of the posterior tibial tendon intra-operatively, repaired as described below in the procedure section. INDICATIONS FOR PROCEDURE: This patient has signs and symptoms clinically  consistent with the above mentioned preoperative diagnosis. Having failed conservative treatment, it was determined that the patient would benefit from surgical intervention. All potential risks, benefits, and complications were discussed with the patient prior to the scheduling of surgery. All the patient's questions were answered and no guarantees were given. The patient wished to proceed with surgery, and informed written consent was obtained.       DETAILS OF PROCEDURE: The patient received a popliteal/saphenous nerve block by the anesthesiologist in the pre-operative holding area. The patient was brought from the pre-operative area and placed on the operating table in the supine position. A pneumatic thigh tourniquet was placed around the patient's well-padded right lower extremity, however the tourniquet remained deflated throughout the entirety of the procedures. Following IV sedation, 10 cc of 0.5% Marcaine with epinephrine was injected along the preoperatively marked incision lines to aid in hemostasis. The right  lower extremity was then scrubbed, prepped, and draped in the usual sterile fashion. A time-out was performed. The patient, procedure, and operative site were confirmed. The following procedures were then performed. PROCEDURE #1: 25721- Common Peroneal Neuroplasty- right ankle/foot:  Attention was directed toward the anterior lateral aspect of the patient's right leg. Using #15 blade, a curvilinear incision was made extending from posterior to the fibular head, extending distally along the course of the common peroneal nerve. The incision was deepened using a combination of sharp and blunt dissection. All vital neurovascular structures were identified and retracted. All tributary vessels were electrocoagulated as encountered. Metzenbaum dissecting scissors were then used to incise the deep crural fascia overlying the peroneus longus muscle. The common peroneal nerve was identified at this time. The nerve was then followed proximally where it was inspected for any possible treatment. The taut fascial bands overlying the common peroneal nerve was then incised. The, peroneal nerve was then followed distally where the fascia overlying the peroneus longus muscle belly was incised both superficial and deep to the muscle to prevent further entrapment of the common peroneal nerve.   At this point, the extent of the release was evaluated, with significant improvement noted. The surgical site was then irrigated with copious amounts of normal sterile saline. 3-0 Vicryl was used to reapproximate the deep subcutaneous layers. 4-0 Vicryl was then used to close the superficial subcutaneous tissues. Skin staples were then used to reapproximate the skin edges. Attention was then directed toward the next procedure. PROCEDURE #2: 47567- Subtalar Joint Arthrodesis- right ankle/foot:  Attention was then directed towards the medial aspect of the right foot. A #15 blade was used to make a skin incision from the posterior aspect of the medial malleolus distally to just distal to the talonavicular joint. The incision was deepened through the subcutaneous tissues down to the deep fascia using a combination of sharp and blunt dissection. All bleeders were electrocauterized and ligated as encountered. Care was taken to protect all vital neurovascular structures including the saphenous vein in the subcutaneous tissues. Next, the posterior tibial tendon sheath was incised and the tendon exposed and inspected. The posterior tibial tendon was then retracted and protected and the deep fascia/capsule overlying the subtalar joint was incised. The subtalar joint was then freed from its capsular tissues and mobilized. Care was taken to protect the calcaneal fibular ligament at this location. Next, an osteotome was used to open the subtalar joint. After gaining access the subtalar joint, the lamina spreaders were then placed into the joint to further expose the joint. A #15 blade was used to resect the interosseous talocalcaneal ligament and the cervical ligaments to gain better visualization. The cartilage on the posterior, anterior, and middle facets of the calcaneus and talus were removed down to the level of subchondral bone using a combination of osteotomes, curettes, and rongeurs.   During the resection of the cartilage, the posterior tibial tendon was iatrogenically severed. The ends of the tendons were tagged using 3-0 Vicryl, to aid in repair later. After removing all cartilage, the surgical site was irrigated with copious amounts of saline and the subchondral plates penetrated using a drill bit. To augment the fusion site and promote bony union, [employer] Augment Injectable and Vitoss Bone Graft Substitute was placed into the arthrodesis site. Next, the subtalar joint was positioned in a neutral position and fixated by placing one large diameter 6.5mm x 80mm [employer] Axsos 3 screw from the calcaneus to the talus perpendicular to the posterior facet of the subtalar joint using modified AO technique and the manufacturers recommended insertion technique. Next, to provide rotational stability to the subtalar joint, another large diameter 6.5mm x 85mm [employer] Axsos 3 screw was placed from the calcaneus to the talus, through the talar neck, perpendicular to the anterior facet of the subtalar joint using modified AO technique and the manufacturers recommended insertion technique. Proper position of the fixation was confirmed using live intraoperative fluoroscopy. The surgical site was irrigated with copious amounts of normal sterile saline. The deep deltoid ligament and subtalar joint capsule was then reapproximated using 3-0 vicryl. Next, attention was directed toward the repair of the posterior tibial  tendon. 2-0 Candice Xbraid suture was then used in a Krackow suture technique to reapproximate the posterior tibial tendon. Next, 3-0 vicryl, was then used to further enhance the tendon repair in a double simple interrupted suture technique. Next, a [employer] AlloWrap Ds 2x4cm graft was applied over the rupture site according to the manufacturers instructions, to aid in postoperative recovery and prevent adhesion formation. Attention was then directed toward the posterior tibial tendon insertion for the next procedure. PROCEDURE #3: 99659- Kidner Procedure- right ankle/foot:  Attention was directed toward the posterior tibial tendon insertion. Next, using #15 blade, the posterior tibial tendon was carefully reflected and freed from the underlying navicular tuberosity. It was determined that no resection of the navicular tuberosity was indicated, and attention was directed toward advancement of the posterior tibial tendon. [email or web address] [place] was inserted into the navicular from plantar to dorsal according to the 's instructions. Next, the Force Fiber suture was inserted into the posterior tibial tendon directly across from the insertion. The next Force Fiber suture anchor was then inserted approximately 1 cm proximal to the insertion. Next, the foot was slightly inverted and advanced distally and tied down. The other suture material was then tied to keep the tendon in place. Attention was then directed towards closure. Next, the surgical wound was irrigated with copious amounts of normal saline. The posterior tibial tendon sheath was then reapproximated using 3-0 vicryl in a running interlocking suture technique. PROCEDURE #4: 67866- Lapidus arthrodesis- right foot:  Attention was then directed towards the dorsal medial aspect of the 1st metatarsal cuneiform joint of the right foot. Using a #15 blade, the above mentioned skin incision was continued over the 1st metatarsal cuneiform joint and extended distally to the 1st metatarsal phalangeal joint. The incision was deepened through the subcutaneous tissues to the level of the deep fascia and capsule using a combination of sharp and blunt dissection. All bleeders were ligated using an electrocautery system. Attention was then directed to the first interspace via the original skin incision where the tendon of the extensor hallucis longus was observed and retracted and protected.   Through a combination of sharp and blunt dissection the soft tissue contractures of the deep transverse intermetatarsal ligament, adductor hallucis tendon, and fibular suspensory ligament were released. Next, the hallux was distracted and pulled medially to further release the residual lateral contracture. At this time, great improvement of the lateral deviation of the hallux on the metatarsal head was noted. Next, using a #15 blade, the capsule overlying the first metatarsal cuneiform joint was incised and reflected from the underlying bone. Great care was taken to identify and protect the Tibialis Anterior tendon in the process. Using a small osteotome, the 1st metatarsal cuneiform joint was pried open to allow full access for preparation of the joint. After mobilizing the joint and freeing it from it's soft tissue attachments, attention was directed towards joint preparation. Using a sagittal saw with a #138 blade, the cartilage on the base of the 1st metatarsal was resected and passed from the operating field. This bone cut was made parallel to the existing cartilage on the metatarsal base. The sagittal saw was then utilized to resect the dorsolateral bone shelf at the base of the 1st metatarsal to aid in complete reduction of the intermetatarsal angle and good apposition of the bony surfaces during fusion. Next, the sagittal saw was used to resect the cartilage on the distal aspect of the medial cuneiform. This resection was angled to resect slightly more laterally to aid in intermetatarsal angle reduction. After satisfactory planning with the sagittal saw had been obtained, the surgical site was irrigated with copious amounts of normal saline. Next, the joint surfaces and subchondral plates of the medial cuneiform and base of the first metatarsal were fenestrated using a 2-0 drill bit. This was done until good bleeding bone was noted. Next, [employer] Medical Augment Injectable was injected about the arthrodesis site to aid in bone healing and postoperative union.       Next, while grasping the hallux, the 1st metatarsal was derotated out of its varus attitude and the intermetatarsal angle was reduced and the joint surfaces firmly apposed. A 0.062 k-wire was driven from the base of the first metatarsal to the medial cuneiform as a means of temporary fixation. The intermetatarsal angle, fusion site, and tibial sesamoid position were checked using live intraoperative fluoroscopy and noted to be excellent. Length of the 1st metatarsal within the metatarsal parabola was noted to be maintained as well and the 1st metatarsal was noted to be parallel to the 2nd metatarsal on a lateral projection with no elevatus or plantarflexion noted. At this time, a template from the Candice [employer]Round[employer] 2 system was placed over the fusion site and a K-wire was driven through the area of the pocket screw hole. Position of the plate was assess via c-arm and noted to be adequate. Next, using the 's recommendations, and the K-wire as a guide, the pocket for the compression screw was created using a cannulated reamer. The K-wire was removed and the [place] [employer]/Round[employer] 2 Lapidus plate was placed overtop the fusion site and held in place by two olive wires. Position of the plate was assessed and noted to be excellent. Next, using the 's recommendations and modified AO technique, the plate was secured using 3.5mm locking screws and a 4.1 x 40mm lag screw in the pocket compression hole. Once the plate was in place, all temporary fixation was removed. At this time, the hardware, position of the foot, and fusion site were assessed via C-arm and noted to be excellent. The surgical wound was then irrigated using copious amounts of normal saline and then attention was then directed towards closure. The deep fascia and capsular layer overlying the joint was re-approximated using 3-0 vicryl in a continuous running suture technique.  The subcutaneous tissues were then approximated using 4-0 vicryl. And the skin edges were re-approximated using 5-0 vicryl in a running intradermal suture technique. PROCEDURES #5,6,7:  [id number] Bucy Osteotomy- right ankle/foot;  54068- Open Repair of Closed Dislocation of Metatarsophalangeal joint- 2nd digit, right foot;  72143- Hammertoe Correction- 2nd digit, right foot:    Attention was directed to the dorsal aspect of the 2nd digit of the right foot. Using a #15 blade, an approximately 5 cm curvi-linear incision was made over the dorsal aspect of the proximal interphalangeal joint and metatarsophalangeal joint. Using sharp and blunt dissection techniques, the incision was deepened through the subcutaneous tissue. Care was taken to identify and retract all vital neurovascular structures. All venous tributaries were electrocoagulated as encountered. Dissection was then continued to the level of the joint capsule. At this time, a transverse tenotomy and capsulotomy were performed at the level of the proximal interphalangeal joint. All ligamentous and capsular structures, as well as the long extensor tendon, were reflected from the underlying bone, thereby allowing exposure and visualization of the head of the proximal phalanx. The long extensor tendon was reflected from its osseous attachments back to the level of the MPJ. The contracture was noted to still be present. Attention was directed to the osteotomy. Utilizing a #160 saw blade on an sagittal saw, the head of the proximal phalanx was resected to approximately the level of the metaphyseal/diaphyseal flair and passed from the operative site. Attention was then directed towards the base of the middle phalanx of the 2nd digit where the sagittal saw was used to resect the cartilage until subchondral plate was seen. Upon completion, the two bones were coapted. The K wire from the Candice medical Toe Tac instrument tray was then retrograded through the middle and distal phalanx. Utilizing the [employer] drill bit, the holes were made for the middle phalanx and the proximal phalanx. Next the form fitting key was inserted into each drill hole to broach and prepare the holes for implantation. The Toe Tac small implant was then implanted in the head of the proximal and the base of the middle phalanx. Intraoperative C-arm fluoroscopy was utilized to show good bone coaptation and without any osseous bridging or gapping at the arthrodesis site. This was confirmed with direct visualization, palpation and intraoperative C-arm fluoroscopy. The K wire was then driven to the base the proximal phalanx. Attention was directed to towards reduction at the level of the metatarsophalangeal joint. Using a #15 blade the capsule is incised and released and a plantar flexory force applied to the proximal phalanx. The medial and lateral capsular ligaments were then incised completing the capsulotomy. Attention was then directed to the notably plantarflexed metatarsal, and attention was directed to the osteotomy. Using a #15 blade, a capsular and periosteal incision was then made linearly over the 2nd metatarsal head. The capsule and periosteal structures were meticulously reflected both dorsally, medially and laterally until excellent soft tissue exposure had been achieved. The articular cartilage of the metatarsal head was then inspected and noted to be white and shiny consistent with healthy cartilage. No signs of cartilage errosion or osteoarthritis was noted. Next, a through and through osteotomy was created beginning in the dorsal 1/3 of the articular cartilage and angled from dorsal-distal to plantar-proximal parallel to the weightbearing surface. Upon completion of the osteotomy, the head of the metatarsal was transposed approximately 3 mm proximally.     Next, using a k-wire from the [employer] [place] Plantar Plate instrument tray, temporary fixation was placed across the osteotomy in the head of the 2nd metatarsal perpendicular to the weight bearing surface. Next, another k-wire from the set was then driven in the proximal phalanx from dorsal to plantar. Next, the [employer] joint distracter was placed on one of the k-wires in the metatarsal and the k-wire in the proximal phalanx. The device was used to distract the joint to gain access to the plantar plate. At this time, the plantar plate was well visualized. At this time a full thickness tear was noted at the midsubstance of the plantar plate. The plantar plate was noted to be attenuated throughout at the level of the second metatarsophalangeal joint. To effectively mobilize and advance the plantar plate back beneath the proximal phalanx, the plantar plate was incised and freed from the flexor tendons with great care to preserve the flexor tendons. Next, using the [facility] [employer] [place] Plantar plate system, ForceFiber was passed into the plantar plate multiple times to provide future attachment to the proximal phalanx. After obtaining satisfactory grab on the plantar plate, the joint distractor was released and k-wire on the proximal phalanx removed. Next, using a k-wire two drill holes were made in the base of the proximal phalanx. One from dorsal medial to plantar lateral and the other from dorsal lateral to plantar medial. Next a wire loop passer from the instrument tray was passed through each hole and the corresponding fiberwire was grasped and pulled dorsally. The plantar plate was then pulled into the base of the proximal phalanx. Next the digit was maximally plantarflexed and the fiberwire was tied tightly to the dorsal aspect of the proximal phalanx. At this time excellent correction of the pre-operative dorsal contracture at the level of the metatarsophalangeal joint was noted. Attention was then directed towards completion of the Weil osteotomy.      Utilizing modified AO fixation principle with lag technique and the manufactures recommended technique, a [employer] twist-off 2.0 x 12 mm screw was inserted from dorsal to plantar across the osteotomy site in a perpendicular orientation. The temporary fixation was removed, and excellent bone to bone apposition was noted at the osteotomy. The remaining articular cartilage overhanging transversely following the previously performed Weil osteotomy was resected utilizing a bone rongeur. Next, the K wire from the hammertoe repair was then driven across the metatarsophalangeal joint to prevent motion across this joint. The wound was then copiously lavaged with sterile normal saline. The extensor tendon was then reapproximated using 3-0 Vicryl suture. Subcutaneous tissues were then reapproximated and 4-0 Vicryl. Skin edges were then reapproximated using 5-0 Vicryl in a running subcuticular suture technique. PROCEDURE #8: 95627- Flexor Tenotomy of the Distal Interphalangeal Joint- 3rd digit, right foot:  Next, attention was directed towards the plantar aspect of the third digit on the right foot. While applying a plantarflexory force to the head of the middle phalanx and a dorsiflexory force to the distal aspect of the digit, a stab incision was made plantarly at the level of the distal interphalangeal joint. The flexor tendon was then severed at this level both medially and laterally. The plantar capsule of the distal interphalangeal joint was also incised to aid in further correction. A gentle extension force was applied to the digit at the level of the distal interphalangeal joint. Next, a 0.045 inch K wire was then driven through the tip of the toe, through the distal, middle and then proximal phalanx to aid in the correction. Excellent correction of the digital contracture was noted. The stab incision was then closed using 4-0 nylon in a simple interrupted suture technique.       PROCEDURE #9: 49004- Application Below Knee Splint- right lower limb:  A soft sterile dressing was applied consisting of Mastisol, Steri-Strips, Betadine, gauze, ABD, cast padding, Ace bandage. Next, copious amounts of cast padding was applied to the patient's right lower extremity from the metatarsal heads to approximately 3 finger breaths distal to the head and neck of the fibula. Next, using moistened padded splint material, a posterior splint was applied from the plantar foot posteriorly up the leg to approximately the midcalf area. ACE compression was then applied from distal to proximal to secure the posterior splint in place and provide compression to prevent post-operative edema. At this time, the foot was then placed in a neutral position to prevent acquired equinus deformity and relieve tension on the surgical repair. END OF PROCEDURE: The patient tolerated the procedure and anesthesia well and was transported from the operating room to the PACU with vital signs stable and vascular status intact to all aspects of the patient's right lower extremity and digital capillary refill time immediate to the digits of the right foot. Following a period of post-operative monitoring, the patient will be discharged home with written and oral wound care and follow-up instructions per Dr. Donaldo Vázquez. The patient is to follow-up with Dr. Clement Lewis in his private office within 5-7 days. The patient is to keep dressing clean, dry and intact at all times. The patient is to call with if any complications occur.     Dictated on behalf of Dr. Donaldo Vázquez, Juan Hendrix DPM  Podiatric Resident, PGY-3    Electronically signed by Asya Vargas DPM on 10/6/2022 at 2:23 PM

## 2022-10-06 NOTE — PROGRESS NOTES
Pt awake and alert at this time. Pt on 1L NC, and VSS. Pain being managed-see MAR, tolerating PO. Skin warm , palpable pulses and able to wiggle right toes. Pt meets criteria to be discharged from Phase 1.

## 2022-10-06 NOTE — ANESTHESIA POSTPROCEDURE EVALUATION
Department of Anesthesiology  Postprocedure Note    Patient: Joanne Buitrago  MRN: [id number]  [place]: 1970  Date of evaluation: 10/6/2022      Procedure Summary     Date: 10/06/22 Room / Location: [facility] OR [address] [place] [address]    Anesthesia Start: 9970 Anesthesia Stop: 6980    Procedures:       COMMON PERONEAL NEUROPLASTY-RIGHT ANKLE/FOOT; POPLITEAL BLOCK; SAPHENOUS (Right)      SUBTALAR JOINT ARTHRODESIS-RIGHT ANKLE/FOOT; WEIL OSTEOTOMY-RIGHT ANKLE FOOT; LAPIDUS-RIGHT FOOT; APPLICATION BELOW KNEE SPLING-RIGHT LOWER LIMB (Right: Ankle)      OPEN REPAIR OF CLOSED DISLOCATION OF METATARSOPHALANGEAL JOINT-SECOND DIGIT RIGHT FOOT; (Right: Ankle)      FLEXOR TENONOTOMY OF THE DISTAL INTERPHALANGEAL JOINT -THIRD DIGIT RIGHT FOOT (Right: Foot) Diagnosis:       Neuritis      Secondary osteoarthritis, right ankle and foot      Posterior tibial tendinitis of right leg      Hallux valgus, right      Metatarsalgia, right foot      Dislocation of metatarsophalangeal joint of lesser toe of right foot, subsequent encounter      Contracture of joint of right foot      (Neuritis [M79.2])      (Secondary osteoarthritis, right ankle and foot [M19.271])      (Posterior tibial tendinitis of right leg [M76.821])      (Hallux valgus, right [M20.11])      (Metatarsalgia, right foot [M77.41])    Surgeons: Andreas Hylton DPM Responsible Provider: Sandoval Orosco MD    Anesthesia Type: general, regional ASA Status: 3          Anesthesia Type: No value filed.     Helen Phase I: Helen Score: 8    Helen Phase II:        Anesthesia Post Evaluation    Patient location during evaluation: PACU  Patient participation: complete - patient participated  Level of consciousness: awake and alert  Airway patency: patent  Nausea & Vomiting: no vomiting and no nausea  Complications: no  Cardiovascular status: hemodynamically stable  Respiratory status: acceptable  Hydration status: stable  Comments: Patient with Rt eye discomfort; feels as if something in eye; gross exam shows some redness/tearing/irritation, but no visual field deficits, no apparent scratches or disruptions; likely has corneal abrasion to some degree given significant length of case and general anesthestic; patient ordered one time tetracaine gtt for comfort and akwa tears for ongoing lubrication and protection; advised that typically corneal abrasions are self limiting and go away with time but if anything worsens or changes or becomes concerning to return to ED or receive ophthmalogic evaluation

## 2022-10-06 NOTE — H&P
Date of Surgery Update:  Leti Palma was seen, history and physical examination reviewed, and patient examined by me today. There have been no significant clinical changes since the completion of the previous history and physical.    The risk, benefits, and alternatives of the proposed procedure have been explained to the patient (or appropriate guardian) and understanding verbalized. All questions answered. Patient wishes to proceed.     Electronically signed by: Lashay Milner DPM,10/6/2022,7:40 AM

## 2022-10-06 NOTE — PROGRESS NOTES
CLINICAL PHARMACY NOTE: MEDS TO BEDS    Total # of Prescriptions Filled: 1   The following medications were delivered to the patient:  Xarelto 10 mg    Additional Documentation:  Delivered to Lani RN=Signed  Belem Canseco CPhT

## 2022-10-06 NOTE — ANESTHESIA PROCEDURE NOTES
image was saved in the patient's record.             Medications Administered  bupivacaine (PF) 0.5 % - Perineural   20 mL - 10/6/2022 7:22:00 AM

## 2022-10-06 NOTE — PROGRESS NOTES
Pt arrived from or to PACU bay 6. Reported received from [place] staff. Pt arousable to voice. Surgical incisions dressings in place to right foot. Pt on 6L simple mask. NSR, and VSS. Will continue to monitor.

## 2022-10-06 NOTE — ANESTHESIA PRE PROCEDURE
Department of Anesthesiology  Preprocedure Note       Name:  Lazaro Mcgee   Age:  47 y.o.  :  1970                                          MRN:  [id number]         Date:  10/6/2022      Surgeon: Víctor Romeo):  Peter Torres DPM    Procedure: Procedure(s):  COMMON PERONEAL NEUROPLASTY-RIGHT ANKLE/FOOT; POPLITEAL BLOCK; SAPHENOUS  SUBTALAR JOINT ARTHRODESIS-RIGHT ANKLE/FOOT; WEIL OSTEOTOMY-RIGHT ANKLE FOOT; LAPIDUS-RIGHT FOOT; APPLICATION BELOW KNEE SPLING-RIGHT LOWER LIMB  OPEN REPAIR OF CLOSED DISLOCATION OF METATARSOPHALANGEAL JOINT-SECOND DIGIT RIGHT FOOT;  FLEXOR TENONOTOMY OF THE DISTAL INTERPHALANGEAL JOINT -THIRD DIGIT RIGHT FOOT    Medications prior to admission:   Prior to Admission medications    Medication Sig Start Date End Date Taking? Authorizing Provider   carvedilol (COREG) 6.25 MG tablet Take 6.25 mg by mouth 2 times daily (with meals)   Yes Historical Provider, MD   estradiol (ESTRACE) 0.5 MG tablet Take 1 mg by mouth daily   Yes Historical Provider, MD   TURMERIC PO Take by mouth  Patient not taking: Reported on 10/3/2022    Historical Provider, MD   buPROPion (WELLBUTRIN SR) 150 MG extended release tablet TAKE 1 TABLET BY MOUTH EVERY DAY IN THE MORNING 9/3/19   Historical Provider, MD   citalopram (CELEXA) 40 MG tablet TAKE 1 TABLET BY MOUTH EVERY DAY 9/3/19   Historical Provider, MD   losartan (COZAAR) 50 MG tablet TAKE 1 TABLET BY MOUTH EVERY DAY  Patient not taking: Reported on 10/3/2022 9/5/19   Historical Provider, MD   LYSINE PO Take by mouth  Patient not taking: Reported on 10/3/2022    Historical Provider, MD   omeprazole (PRILOSEC) 10 MG delayed release capsule Take 10 mg by mouth as needed    Historical Provider, MD       Current medications:    No current facility-administered medications for this encounter. Allergies:     Allergies   Allergen Reactions    Latex Rash     Also occurs with bandaids    Ace Inhibitors Cough    Penicillins Rash       Problem List: There is no problem list on file for this patient. Past Medical History:        Diagnosis Date    Anxiety     Cancer ([address]     Depression     Dissection of coronary artery     GERD (gastroesophageal reflux disease)     Hx of skin cancer, basal cell     back of left arm    Hypertension     PONV (postoperative nausea and vomiting)        Past Surgical History:        Procedure Laterality Date    CARDIAC CATHETERIZATION N/A     2021    ENDOMETRIAL ABLATION N/A     HYSTERECTOMY (CERVIX STATUS UNKNOWN)         Social History:    Social History     Tobacco Use    Smoking status: Never    Smokeless tobacco: Never   Substance Use Topics    Alcohol use: Yes     Comment: very rarely                                Counseling given: Not Answered      Vital Signs (Current):   Vitals:    10/03/22 1502   Weight: 202 lb (91.6 kg)   Height: 5' 1\" (1.549 m)                                              BP Readings from Last 3 Encounters:   10/10/19 118/68       NPO Status:                                                                                 BMI:   Wt Readings from Last 3 Encounters:   10/03/22 202 lb (91.6 kg)     Body mass index is 38.17 kg/m². CBC: No results found for: WBC, RBC, HGB, HCT, MCV, RDW, PLT    CMP: No results found for: NA, K, CL, CO2, BUN, CREATININE, GFRAA, AGRATIO, LABGLOM, GLUCOSE, GLU, PROT, CALCIUM, BILITOT, ALKPHOS, AST, ALT    POC Tests: No results for input(s): POCGLU, POCNA, POCK, POCCL, POCBUN, POCHEMO, POCHCT in the last 72 hours.     Coags: No results found for: PROTIME, INR, APTT    HCG (If Applicable): No results found for: PREGTESTUR, PREGSERUM, HCG, HCGQUANT     ABGs: No results found for: PHART, PO2ART, BSO3RYY, JTR9VMM, BEART, U5JYRWBY     Type & Screen (If Applicable):  No results found for: LABABO, LABRH    Drug/Infectious Status (If Applicable):  No results found for: HIV, HEPCAB    COVID-19 Screening (If Applicable): No results found for: COVID19        Anesthesia Evaluation  Patient summary reviewed and Nursing notes reviewed   history of anesthetic complications: PONV. Airway: Mallampati: I  TM distance: >3 FB   Neck ROM: full  Mouth opening: > = 3 FB   Dental: normal exam         Pulmonary:       (-) asthma and shortness of breath                           Cardiovascular:    (+) hypertension:, CAD:,     (-)  angina                Neuro/Psych:   (+) depression/anxiety    (-) CVA           GI/Hepatic/Renal:   (+) GERD:,      (-) liver disease       Endo/Other:    (+) malignancy/cancer. (-) diabetes mellitus, hypothyroidism               Abdominal:             Vascular:     - PVD. Other Findings:           Anesthesia Plan      general and regional     ASA 3     (Pt consented for popliteal and adductor canal nerve blocks for post-operative pain control. Discussed risks/benefits of procedure, including bleeding/infection, nerve injury, LAST. Pt understood and expressed understanding to continue.)  Induction: intravenous. MIPS: Postoperative opioids intended and Prophylactic antiemetics administered. Anesthetic plan and risks discussed with patient. Use of blood products discussed with patient whom. Plan discussed with CRNA.                     Cassandra Salas MD   10/6/2022

## 2022-10-06 NOTE — ANESTHESIA PROCEDURE NOTES
Peripheral Block    Patient location during procedure: pre-op  Reason for block: post-op pain management and at surgeon's request  Start time: 10/6/2022 7:27 AM  End time: 10/6/2022 7:28 AM  Staffing  Performed: anesthesiologist   Anesthesiologist: Josie Vasquez MD  Preanesthetic Checklist  Completed: patient identified, IV checked, site marked, risks and benefits discussed, surgical/procedural consents, equipment checked, pre-op evaluation, timeout performed, anesthesia consent given, oxygen available and monitors applied/VS acknowledged  Peripheral Block   Patient position: supine  Prep: ChloraPrep  Provider prep: mask and sterile gloves  Patient monitoring: cardiac monitor, continuous pulse ox, frequent blood pressure checks and IV access  Block type: Femoral  Adductor canal (Low Femoral)  Laterality: right  Injection technique: single-shot  Guidance: ultrasound guided  Local infiltration: lidocaine  Infiltration strength: 1 %  Local infiltration: lidocaine  Dose: 3 mL    Needle   Needle type: long-bevel   Needle gauge: 20 G  Needle localization: ultrasound guidance  Needle length: 10 cm  Assessment   Injection assessment: negative aspiration for heme, no paresthesia on injection and local visualized surrounding nerve on ultrasound  Paresthesia pain: none  Slow fractionated injection: yes  Hemodynamics: stable  Real-time US image taken/store: yes  Outcomes: uncomplicated and patient tolerated procedure well    Additional Notes  U/S 34497. (1) Under ultrasound guidance, a 20 gauge needle was inserted and placed in close proximity to the saph nerve. (2) Ultrasound was also used to visualize the spread of the anesthetic in close proximity to the nerve being blocked. (3) The nerve appeared anatomically normal, and (4 there were no apparent abnormal pathological findings on the image that were readily visible and related to the nerve being blocked.  (5) A permanent ultrasound image was saved in the patient's record.             Medications Administered  bupivacaine (PF) 0.5 % - Perineural   20 mL - 10/6/2022 7:27:00 AM

## 2022-10-06 NOTE — DISCHARGE INSTRUCTIONS
[address]   Telephone: [phone]  Dr. Jacqueline [place]  (1/4 mile of [facility], by the corner of [address])                                           Post Operative Instructions    1. Have Prescriptions filled and take as directed. All medications should be taken with food or milk. 2.  Keep foot elevated six inches above the level of the heart. Support feet, legs, and knees with pillows. 3.  KEEP FOOT ELEVATED AS MUCH AS POSSIBLE UNTIL YOUR NEXT VISIT    4. Place an ice pack on the bandaged site for 15 minutes every hour while awake    5. Keep dressing clean, dry, and intact. DO NOT REMOVE DRESSING. CALL THE OFFICE IF BANDAGE COMES OFF. 6.  For the first 7 days after surgery, take temperature by mouth three times a day. Call the office if greater than 101F. 7. NONweightbearing to the right lower extremity with crutches / walker. 8.  All instructions are to be followed until otherwise instructed by your surgeon. 9.  Call our office if you have any concerns or questions which arise. Our phones are answered 24 hours a day. [phone]    87. Your first post-operative appointment is scheduled, call the office for appointment date and time if not known prior to today. GENERAL SURGERY DISCHARGE INSTRUCTIONS    Follow your surgeons instructions. Follow up with your surgeon as directed. Observe the operative area for signs of excessive bleeding. If needed apply pressure,elevate if able and contact your surgeon. Observe the operative site for any signs of infection- such as increased pain,redness,fever greater than 101 degrees,swelling, foul odor or drainage. Contact your surgeon if any of these symptoms are present. Keep operative site clean and dry. Do not remove dressing unless instructed to by surgeon. Apply ice as directed.   If unable to urinate once you are at home,  notify your surgeon or go to the Emergency Room. Avoid pulling,pushing or tugging to suture line. If you become short of breath call your doctor or go to the ER. Take medications as directed. Pain medication should be taken with food. Do not drive or operate machinery while taking narcotics. For any problems or question call your surgeon. ANESTHESIA DISCHARGE INSTRUCTIONS    Wear your seatbelt home. You are under the influence of drugs-do not drink alcohol, drive, operate machinery, make any important decisions or sign any legal documents for 24 hours. A responsible adult needs to be with you for 24 hours. You may experience lightheadedness, dizziness, or sleepiness following surgery. Rest at home today- increase activity as tolerated. Progress slowly to a regular diet unless your physician has instructed you otherwise. Drink plenty of water. If persistent nausea and vomiting becomes a problem, call your physician. Coughing, sore throat and muscle aches are other side effects of anesthesia, and should improve with time. Do not drive or operate machinery while taking narcotics.

## 2022-10-06 NOTE — PROGRESS NOTES
Monitors applied. Baseline VS= 137/87  P=74  O2 sat= 99% with 2L via NC. Time out completed per protocol prior to Right popliteal and adductor canal blocke. Versed 1mg and Fentanyl 50mcg given as ordered by anesthesia.

## 2024-11-12 RX ORDER — SEMAGLUTIDE 1 MG/.5ML
1 INJECTION, SOLUTION SUBCUTANEOUS
COMMUNITY

## 2024-11-12 NOTE — PROGRESS NOTES
DATE 11/14/2024 ___      PLACE ___ [address]                          TIME 1000___                                             _x__ [address]                           Arrival_0830                                                                                                                                                                                                        Surgeons office to give arrival time _____                                                                                                 If you have not received time contact your surgeon.                                                                                                                              Surgery dates,times and arrival times are subject to change.Please check with your surgeon.  Bring a photo I.D.,insurance card and form of payment if you have a co pay.  Plan for someone 18 years or older to stay on site while you are her and to take you home after surgery.You are not permitted to drive yourself home. You cannot leave via Uber, Lyft, Taxi or Medical Transport by yourself. You must have someone with you. It is strongly suggested that someone stay with you the first 24 hours after anesthesia. Your surgery will be cancelled if you do not have a ride home. A parent or legal guardian guardian must stay with a child until the child is discharged. Please do not bring other children.  A History/Physical is required to be completed and dated within 30 days of your surgery. To be done by PCP __ Surgeon ___or Hospitalist __x_  You may be required to get labs __ EKG __ or a chest Xray ___ prior to surgery. To be done by ____  Nothing to eat or drink after midnight the evening prior to surgery.  If your surgeon requires a bowel prep, follow their instructions.  You may brush your teeth.  Bring a complete list of your medications including vitamins and supplement with the name,dose and frequency.  Take the following

## 2024-11-13 NOTE — H&P
[facility]   HISTORY AND PHYSICAL      I am seeing LIDIA PEREZ at the request of Dr Rhodes for evaluation of the patient's medical problems prior to SUBTALAR JOINT ARTHRODESIS-LEFT FOOT, LAPIDUS ARTHRODESIS, LEFT FOOT-POPLITEAL BLOCK, SAPHENOUS-VICKIE, COTTON OSTEOTOMY-LEFT FOOT, KIDNER RECONSTRUCTION-LEFT FOOT,   FLEXOR TENOTOMY OF SECOND TOE-LEFT FOOT, APPLICATION OF BELOW KNEE SPLINT-LEFT FOOT         11/14/2024 8:52 AM    Patient Information:  LIDIA PEREZ is a 54 y.o. female [id number]  PCP:  Jacky Burton MD (Tel: [phone] )    Chief complaint:  Left foot pain    History of Present Illness:  Lidia Perez is a 54 y.o. female who presented with pain left foot. Symptom onset was chronic for a time period of years but has worsened over the past 2 years. The severity is described as moderate to severe. The course of her symptoms over time is worsening. The symptoms improved with rest and worsened with ambulation.    History obtained from patient and chart review.     Denies chest pain, shortness of breath, cough, fever, ill contacts. Reports she is able to climb a flight of steps without stopping to catch her breath or chest pains.     REVIEW OF SYSTEMS:   Constitutional:  Negative for fever,chills or night sweats  ENT:  Negative for rhinorrhea, epistaxis, hoarseness, sore throat.  Respiratory:   Negative for shortness of breath, wheezing  Cardiovascular:   Negative for chest pain, palpitations   Gastrointestinal:  Negative for nausea, vomiting, diarrhea  Genitourinary:  Negative for polyuria, dysuria   Hematologic/Lymphatic:  Negative for  bleeding tendency, easy bruising  Musculoskeletal:  Negative for myalgias and arthralgias, + left foot pain  Neurologic:  Negative for confusion, dysarthria.  Skin:  Negative for itching, rash  Psychiatric:  Negative for depression, anxiety,  Takes low dose asa daily, last dose 1 week ago. Denies recent episodes of chest pain or dyspnea. Last saw cardiologist 7/30/2024 and was stable at that time. Resume asa post op when okay with podiatry. Continue carvedilol.    HTN. BP controlled. Takes carvedilol.   LOKI. Has CPAP at home but has not been using.     Patient is medically optimized for surgery.    Thank you for the opportunity to participate in the care of your patient.  Naty Mak, NAYA - CNP    11/14/2024 8:52 AM

## 2024-11-14 ENCOUNTER — ANESTHESIA (OUTPATIENT)
Dept: OPERATING ROOM | Age: 54
End: 2024-11-14
Payer: COMMERCIAL

## 2024-11-14 ENCOUNTER — HOSPITAL ENCOUNTER (OUTPATIENT)
Age: 54
Setting detail: OUTPATIENT SURGERY
Discharge: HOME | End: 2024-11-14
Attending: PODIATRIST | Admitting: PODIATRIST
Payer: COMMERCIAL

## 2024-11-14 ENCOUNTER — ANESTHESIA EVENT (OUTPATIENT)
Dept: OPERATING ROOM | Age: 54
End: 2024-11-14
Payer: COMMERCIAL

## 2024-11-14 VITALS
OXYGEN SATURATION: 96 % | HEIGHT: 64 IN | TEMPERATURE: 98 F | RESPIRATION RATE: 10 BRPM | WEIGHT: 190 LBS | SYSTOLIC BLOOD PRESSURE: 125 MMHG | BODY MASS INDEX: 32.44 KG/M2 | HEART RATE: 77 BPM | DIASTOLIC BLOOD PRESSURE: 87 MMHG

## 2024-11-14 PROCEDURE — 6360000002 HC RX W HCPCS: Performed by: ANESTHESIOLOGY

## 2024-11-14 PROCEDURE — C1734 ORTH/DEVIC/DRUG BN/BN,TIS/BN: HCPCS | Performed by: PODIATRIST

## 2024-11-14 PROCEDURE — 7100000010 HC PHASE II RECOVERY - FIRST 15 MIN: Performed by: PODIATRIST

## 2024-11-14 PROCEDURE — 7100000001 HC PACU RECOVERY - ADDTL 15 MIN: Performed by: PODIATRIST

## 2024-11-14 PROCEDURE — 2709999900 HC NON-CHARGEABLE SUPPLY: Performed by: PODIATRIST

## 2024-11-14 PROCEDURE — C1769 GUIDE WIRE: HCPCS | Performed by: PODIATRIST

## 2024-11-14 PROCEDURE — 7100000000 HC PACU RECOVERY - FIRST 15 MIN: Performed by: PODIATRIST

## 2024-11-14 PROCEDURE — 6360000002 HC RX W HCPCS: Performed by: NURSE ANESTHETIST, CERTIFIED REGISTERED

## 2024-11-14 PROCEDURE — 2580000003 HC RX 258: Performed by: PODIATRIST

## 2024-11-14 PROCEDURE — C1713 ANCHOR/SCREW BN/BN,TIS/BN: HCPCS | Performed by: PODIATRIST

## 2024-11-14 PROCEDURE — 3700000000 HC ANESTHESIA ATTENDED CARE: Performed by: PODIATRIST

## 2024-11-14 PROCEDURE — 6360000002 HC RX W HCPCS: Performed by: PODIATRIST

## 2024-11-14 PROCEDURE — 3700000001 HC ADD 15 MINUTES (ANESTHESIA): Performed by: PODIATRIST

## 2024-11-14 PROCEDURE — 2500000003 HC RX 250 WO HCPCS: Performed by: NURSE ANESTHETIST, CERTIFIED REGISTERED

## 2024-11-14 PROCEDURE — 64445 NJX AA&/STRD SCIATIC NRV IMG: CPT | Performed by: ANESTHESIOLOGY

## 2024-11-14 PROCEDURE — 2720000010 HC SURG SUPPLY STERILE: Performed by: PODIATRIST

## 2024-11-14 PROCEDURE — 7100000011 HC PHASE II RECOVERY - ADDTL 15 MIN: Performed by: PODIATRIST

## 2024-11-14 PROCEDURE — 6370000000 HC RX 637 (ALT 250 FOR IP): Performed by: ANESTHESIOLOGY

## 2024-11-14 PROCEDURE — 3600000004 HC SURGERY LEVEL 4 BASE: Performed by: PODIATRIST

## 2024-11-14 PROCEDURE — 3600000014 HC SURGERY LEVEL 4 ADDTL 15MIN: Performed by: PODIATRIST

## 2024-11-14 DEVICE — LOCKING SCREW
Type: IMPLANTABLE DEVICE | Site: FOOT | Status: FUNCTIONAL
Brand: VARIAX

## 2024-11-14 DEVICE — POLYAXIAL LOCKING PLATE -  LAPIDUS CROSS-PLATE, LEFT (T10)
Type: IMPLANTABLE DEVICE | Site: FOOT | Status: FUNCTIONAL
Brand: ANCHORAGE

## 2024-11-14 DEVICE — ALLOGRAFT BNE 2.5 CC DEMINERALIZED BNE MTRX FIBER ALLOFIBER: Type: IMPLANTABLE DEVICE | Site: FOOT | Status: FUNCTIONAL

## 2024-11-14 DEVICE — CANNULATED SCREW - 16MM THREAD
Type: IMPLANTABLE DEVICE | Site: FOOT | Status: FUNCTIONAL
Brand: AXSOS 3

## 2024-11-14 DEVICE — CP LAG SCREW (T10)
Type: IMPLANTABLE DEVICE | Site: FOOT | Status: FUNCTIONAL
Brand: ANCHORAGE

## 2024-11-14 DEVICE — INJECTABLE KIT
Type: IMPLANTABLE DEVICE | Site: FOOT | Status: FUNCTIONAL
Brand: AUGMENT® INJECTABLE

## 2024-11-14 DEVICE — GRAFT BNE W22XL20MM THK35X8MM BICORT EVANS WDG FOR OSTEOTMY: Type: IMPLANTABLE DEVICE | Site: FOOT | Status: FUNCTIONAL

## 2024-11-14 DEVICE — ANCHOR SUTURE STRL CITREFIX XPRESS: Type: IMPLANTABLE DEVICE | Site: FOOT | Status: FUNCTIONAL

## 2024-11-14 DEVICE — SONICANCHOR KIT
Type: IMPLANTABLE DEVICE | Site: FOOT | Status: FUNCTIONAL
Brand: SONICANCHOR

## 2024-11-14 RX ORDER — SODIUM CHLORIDE 9 MG/ML
INJECTION, SOLUTION INTRAVENOUS CONTINUOUS
Status: DISCONTINUED | OUTPATIENT
Start: 2024-11-14 | End: 2024-11-14 | Stop reason: HOSPADM

## 2024-11-14 RX ORDER — LIDOCAINE HCL/EPINEPHRINE/PF 2%-1:200K
VIAL (ML) INJECTION
Status: COMPLETED | OUTPATIENT
Start: 2024-11-14 | End: 2024-11-14

## 2024-11-14 RX ORDER — BUPIVACAINE HYDROCHLORIDE 2.5 MG/ML
INJECTION, SOLUTION EPIDURAL; INFILTRATION; INTRACAUDAL
Status: COMPLETED | OUTPATIENT
Start: 2024-11-14 | End: 2024-11-14

## 2024-11-14 RX ORDER — LIDOCAINE HYDROCHLORIDE 10 MG/ML
0.5 INJECTION, SOLUTION EPIDURAL; INFILTRATION; INTRACAUDAL; PERINEURAL ONCE
Status: DISCONTINUED | OUTPATIENT
Start: 2024-11-14 | End: 2024-11-14 | Stop reason: HOSPADM

## 2024-11-14 RX ORDER — IPRATROPIUM BROMIDE AND ALBUTEROL SULFATE 2.5; .5 MG/3ML; MG/3ML
1 SOLUTION RESPIRATORY (INHALATION)
Status: DISCONTINUED | OUTPATIENT
Start: 2024-11-14 | End: 2024-11-14 | Stop reason: HOSPADM

## 2024-11-14 RX ORDER — FENTANYL CITRATE 50 UG/ML
INJECTION, SOLUTION INTRAMUSCULAR; INTRAVENOUS
Status: DISCONTINUED | OUTPATIENT
Start: 2024-11-14 | End: 2024-11-14 | Stop reason: SDUPTHER

## 2024-11-14 RX ORDER — HYDROMORPHONE HYDROCHLORIDE 2 MG/ML
0.5 INJECTION, SOLUTION INTRAMUSCULAR; INTRAVENOUS; SUBCUTANEOUS EVERY 5 MIN PRN
Status: DISCONTINUED | OUTPATIENT
Start: 2024-11-14 | End: 2024-11-14 | Stop reason: HOSPADM

## 2024-11-14 RX ORDER — SODIUM CHLORIDE 9 MG/ML
INJECTION, SOLUTION INTRAVENOUS PRN
Status: DISCONTINUED | OUTPATIENT
Start: 2024-11-14 | End: 2024-11-14 | Stop reason: HOSPADM

## 2024-11-14 RX ORDER — LORAZEPAM 2 MG/ML
0.5 INJECTION INTRAMUSCULAR
Status: DISCONTINUED | OUTPATIENT
Start: 2024-11-14 | End: 2024-11-14 | Stop reason: HOSPADM

## 2024-11-14 RX ORDER — MIDAZOLAM HYDROCHLORIDE 1 MG/ML
INJECTION, SOLUTION INTRAMUSCULAR; INTRAVENOUS
Status: DISCONTINUED | OUTPATIENT
Start: 2024-11-14 | End: 2024-11-14 | Stop reason: SDUPTHER

## 2024-11-14 RX ORDER — PROPOFOL 10 MG/ML
INJECTION, EMULSION INTRAVENOUS
Status: DISCONTINUED | OUTPATIENT
Start: 2024-11-14 | End: 2024-11-14 | Stop reason: SDUPTHER

## 2024-11-14 RX ORDER — DIPHENHYDRAMINE HYDROCHLORIDE 50 MG/ML
12.5 INJECTION INTRAMUSCULAR; INTRAVENOUS
Status: DISCONTINUED | OUTPATIENT
Start: 2024-11-14 | End: 2024-11-14 | Stop reason: HOSPADM

## 2024-11-14 RX ORDER — FENTANYL CITRATE 50 UG/ML
25 INJECTION, SOLUTION INTRAMUSCULAR; INTRAVENOUS EVERY 5 MIN PRN
Status: DISCONTINUED | OUTPATIENT
Start: 2024-11-14 | End: 2024-11-14 | Stop reason: HOSPADM

## 2024-11-14 RX ORDER — SODIUM CHLORIDE 0.9 % (FLUSH) 0.9 %
5-40 SYRINGE (ML) INJECTION PRN
Status: DISCONTINUED | OUTPATIENT
Start: 2024-11-14 | End: 2024-11-14 | Stop reason: HOSPADM

## 2024-11-14 RX ORDER — ONDANSETRON 2 MG/ML
INJECTION INTRAMUSCULAR; INTRAVENOUS
Status: DISCONTINUED | OUTPATIENT
Start: 2024-11-14 | End: 2024-11-14 | Stop reason: SDUPTHER

## 2024-11-14 RX ORDER — OXYCODONE HYDROCHLORIDE 5 MG/1
5 TABLET ORAL
Status: DISCONTINUED | OUTPATIENT
Start: 2024-11-14 | End: 2024-11-14 | Stop reason: HOSPADM

## 2024-11-14 RX ORDER — BUPIVACAINE HYDROCHLORIDE 5 MG/ML
INJECTION, SOLUTION EPIDURAL; INTRACAUDAL
Status: COMPLETED | OUTPATIENT
Start: 2024-11-14 | End: 2024-11-14

## 2024-11-14 RX ORDER — LABETALOL HYDROCHLORIDE 5 MG/ML
10 INJECTION, SOLUTION INTRAVENOUS
Status: DISCONTINUED | OUTPATIENT
Start: 2024-11-14 | End: 2024-11-14 | Stop reason: HOSPADM

## 2024-11-14 RX ORDER — DEXAMETHASONE SODIUM PHOSPHATE 4 MG/ML
INJECTION, SOLUTION INTRA-ARTICULAR; INTRALESIONAL; INTRAMUSCULAR; INTRAVENOUS; SOFT TISSUE
Status: DISCONTINUED | OUTPATIENT
Start: 2024-11-14 | End: 2024-11-14 | Stop reason: SDUPTHER

## 2024-11-14 RX ORDER — APREPITANT 40 MG/1
40 CAPSULE ORAL ONCE
Status: COMPLETED | OUTPATIENT
Start: 2024-11-14 | End: 2024-11-14

## 2024-11-14 RX ORDER — SCOLOPAMINE TRANSDERMAL SYSTEM 1 MG/1
1 PATCH, EXTENDED RELEASE TRANSDERMAL
Status: DISCONTINUED | OUTPATIENT
Start: 2024-11-14 | End: 2024-11-14 | Stop reason: HOSPADM

## 2024-11-14 RX ORDER — PROCHLORPERAZINE EDISYLATE 5 MG/ML
5 INJECTION INTRAMUSCULAR; INTRAVENOUS
Status: DISCONTINUED | OUTPATIENT
Start: 2024-11-14 | End: 2024-11-14 | Stop reason: HOSPADM

## 2024-11-14 RX ORDER — ONDANSETRON 2 MG/ML
4 INJECTION INTRAMUSCULAR; INTRAVENOUS
Status: DISCONTINUED | OUTPATIENT
Start: 2024-11-14 | End: 2024-11-14 | Stop reason: HOSPADM

## 2024-11-14 RX ORDER — ASPIRIN 81 MG/1
81 TABLET, CHEWABLE ORAL DAILY
COMMUNITY

## 2024-11-14 RX ORDER — LIDOCAINE HYDROCHLORIDE 20 MG/ML
INJECTION, SOLUTION INFILTRATION; PERINEURAL
Status: DISCONTINUED | OUTPATIENT
Start: 2024-11-14 | End: 2024-11-14 | Stop reason: SDUPTHER

## 2024-11-14 RX ORDER — NALOXONE HYDROCHLORIDE 0.4 MG/ML
INJECTION, SOLUTION INTRAMUSCULAR; INTRAVENOUS; SUBCUTANEOUS PRN
Status: DISCONTINUED | OUTPATIENT
Start: 2024-11-14 | End: 2024-11-14 | Stop reason: HOSPADM

## 2024-11-14 RX ORDER — SODIUM CHLORIDE 0.9 % (FLUSH) 0.9 %
5-40 SYRINGE (ML) INJECTION EVERY 12 HOURS SCHEDULED
Status: DISCONTINUED | OUTPATIENT
Start: 2024-11-14 | End: 2024-11-14 | Stop reason: HOSPADM

## 2024-11-14 RX ADMIN — ONDANSETRON 4 MG: 2 INJECTION INTRAMUSCULAR; INTRAVENOUS at 09:47

## 2024-11-14 RX ADMIN — FENTANYL CITRATE 50 MCG: 50 INJECTION, SOLUTION INTRAMUSCULAR; INTRAVENOUS at 12:54

## 2024-11-14 RX ADMIN — DEXAMETHASONE SODIUM PHOSPHATE 4 MG: 4 INJECTION, SOLUTION INTRAMUSCULAR; INTRAVENOUS at 09:47

## 2024-11-14 RX ADMIN — LIDOCAINE HYDROCHLORIDE 50 MG: 20 INJECTION, SOLUTION INFILTRATION; PERINEURAL at 09:42

## 2024-11-14 RX ADMIN — CEFAZOLIN 2000 MG: 2 INJECTION, POWDER, FOR SOLUTION INTRAMUSCULAR; INTRAVENOUS at 09:46

## 2024-11-14 RX ADMIN — FENTANYL CITRATE 50 MCG: 50 INJECTION, SOLUTION INTRAMUSCULAR; INTRAVENOUS at 09:42

## 2024-11-14 RX ADMIN — BUPIVACAINE HYDROCHLORIDE 25 ML: 2.5 INJECTION, SOLUTION EPIDURAL; INFILTRATION; INTRACAUDAL; PERINEURAL at 09:11

## 2024-11-14 RX ADMIN — MIDAZOLAM 2 MG: 1 INJECTION INTRAMUSCULAR; INTRAVENOUS at 09:35

## 2024-11-14 RX ADMIN — SODIUM CHLORIDE: 9 INJECTION, SOLUTION INTRAVENOUS at 09:09

## 2024-11-14 RX ADMIN — PROPOFOL 150 MG: 10 INJECTION, EMULSION INTRAVENOUS at 09:42

## 2024-11-14 RX ADMIN — APREPITANT 40 MG: 40 CAPSULE ORAL at 08:54

## 2024-11-14 RX ADMIN — BUPIVACAINE HYDROCHLORIDE 30 ML: 5 INJECTION, SOLUTION EPIDURAL; INTRACAUDAL at 09:11

## 2024-11-14 ASSESSMENT — PAIN - FUNCTIONAL ASSESSMENT: PAIN_FUNCTIONAL_ASSESSMENT: 0-10

## 2024-11-14 NOTE — OP NOTE
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.82]     * Posterior tibial tendinitis, left [M76.822]     * Contracture of joint of left foot [M24.575]    Post-Op Diagnosis: Same       Procedure(s):  01952 - BONE MARROW ASPIRATION  98306 - SUBTALAR JOINT ARTHRODESIS-LEFT FOOT  16636 - LAPIDUS ARTHRODESIS, LEFT FOOT  64561 - KIDNER RECONSTRUCTION-LEFT FOOT  16630 - FLEXOR TENOTOMY OF THIRD TOE-LEFT FOOT  07031 - APPLICATION OF BELOW KNEE SPLINT-LEFT FOOT  86041 - USE OF INTRA-OPERATIVE FLUOROSCOPY    Surgeon(s):  Jigar Rhodes DPM    Assistant:  Resident: Iban West, PGY-3  Student:  Pelon Alegria, MS4     Anesthesia: General with popliteal block     Hemostasis: anatomic dissection and electrocautery, pneumatic thigh tourniquet however not inflated     Injectables: Pre-Op 10 cc of 2% Lidocaine with epinephrine     Materials: 3-0/4-0/5-0 Vicryl, 4-0 Nylon     Estimated Blood Loss (mL): 200      Complications: None    Specimens:   * No specimens in log *    Implants:  Implant Name Type Inv. Item Serial No.  Lot No. LRB No. Used Action   ALLOGRAFT BNE 2.5 CC DEMINERALIZED BNE MTRX FIBER ALLOFIBER - [id number]  ALLOGRAFT BNE 2.5 CC DEMINERALIZED BNE MTRX FIBER ALLOFIBER [id number] [employer] [id number] Left 1 Implanted   GRAFT BNE INJ 1.5 CC AUG - [id number]  GRAFT BNE INJ 1.5 CC AUG [id number] [employer] UXFLIPSapheon [id number] Left 1 Implanted   KIT SONIC ANCHOR FORCE FIB 2.5X10MM 2 0 - [id number]  KIT SONIC ANCHOR FORCE FIB 2.5X10MM 2 0  [employer] UXFLIPSapheon [id number] Left 2 Implanted   SCREW BNE LAXMI 6.5X75 MM 16 MM THRD AXSOS 3 - [id number]  SCREW BNE LAXMI 6.5X75 MM 16 MM

## 2024-11-14 NOTE — PROGRESS NOTES
Time out done, 02 on @ 2 l/min per n/c, then Dr Manzano  completed left adductor canal & popliteal nerve block, patient tolerated procedure well.

## 2024-11-14 NOTE — PROGRESS NOTES
Discharge instructions review with patient and brother. All home medications have been reviewed, pt v/u. Discharge instructions signed. Pt discharged via wheelchair. Pt discharged with dressing cdi and all belongings. brother taking stable pt home. Patient stated she had walker, knee scooter, and crutches at homes along with post op medications.

## 2024-11-14 NOTE — ANESTHESIA PRE PROCEDURE
lb)     Body mass index is 32.61 kg/m².    CBC: No results found for: \"WBC\", \"RBC\", \"HGB\", \"HCT\", \"MCV\", \"RDW\", \"PLT\"    CMP: No results found for: \"NA\", \"K\", \"CL\", \"CO2\", \"BUN\", \"CREATININE\", \"GFRAA\", \"AGRATIO\", \"LABGLOM\", \"GLUCOSE\", \"GLU\", \"CALCIUM\", \"BILITOT\", \"ALKPHOS\", \"AST\", \"ALT\"    POC Tests: No results for input(s): \"POCGLU\", \"POCNA\", \"POCK\", \"POCCL\", \"POCBUN\", \"POCHEMO\", \"POCHCT\" in the last 72 hours.    Coags: No results found for: \"PROTIME\", \"INR\", \"APTT\"    HCG (If Applicable): No results found for: \"PREGTESTUR\", \"PREGSERUM\", \"HCG\", \"HCGQUANT\"     ABGs: No results found for: \"PHART\", \"PO2ART\", \"TVB7OMX\", \"JUV4NZA\", \"BEART\", \"J6HUAVAG\"     Type & Screen (If Applicable):  No results found for: \"ABORH\", \"LABANTI\"    Drug/Infectious Status (If Applicable):  No results found for: \"HIV\", \"HEPCAB\"    COVID-19 Screening (If Applicable): No results found for: \"COVID19\"        Anesthesia Evaluation     history of anesthetic complications:   Airway: Mallampati: II  TM distance: >3 FB   Neck ROM: full  Mouth opening: > = 3 FB   Dental:          Pulmonary:                              Cardiovascular:    (+) hypertension:, CAD:        Rhythm: regular  Rate: normal                    Neuro/Psych:   (+) psychiatric history:            GI/Hepatic/Renal:   (+) GERD:          Endo/Other:                     Abdominal:             Vascular:          Other Findings:       Anesthesia Plan      general     ASA 2       Induction: intravenous.      Anesthetic plan and risks discussed with patient.      Plan discussed with CRNA.                DERICK MILIAN MD   11/14/2024

## 2024-11-14 NOTE — ANESTHESIA POSTPROCEDURE EVALUATION
Department of Anesthesiology  Postprocedure Note    Patient: Rose Mary Perez  MRN: [id number]  YOB: 1970  Date of evaluation: 11/14/2024    Procedure Summary       Date: 11/14/24 Room / Location: [address]    Anesthesia Start: 0935 Anesthesia Stop: 1312    Procedures:       SUBTALAR JOINT ARTHRODESIS-LEFT FOOT, LAPIDUS ARTHRODESIS, LEFT FOOT, BONE MARROW ASPIRATION-POPLITEAL BLOCK, SAPHENOUS-VICKIE (Left: Ankle)      KIDNER RECONSTRUCTION-LEFT FOOT (Left: Foot)      FLEXOR TENOTOMY OF THIRD TOE-LEFT FOOT, APPLICATION OF BELOW KNEE SPLINT-LEFT FOOT (Left: Foot) Diagnosis:       Secondary osteoarthritis of foot, left      Metatarsus primus varus of left foot      Hallux valgus, left      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    Helen Phase I: Helen Score: 10    Helen Phase II: Helen Score: 10    Anesthesia Post Evaluation    Patient location during evaluation: PACU  Patient participation: complete - patient participated  Level of consciousness: awake  Airway patency: patent  Nausea & Vomiting: no nausea and no vomiting  Cardiovascular status: blood pressure returned to baseline and hemodynamically stable  Respiratory status: acceptable  Hydration status: euvolemic  Multimodal analgesia pain management approach  Pain management: adequate    No notable events documented.

## 2024-11-14 NOTE — ANESTHESIA PROCEDURE NOTES
Peripheral Block    Patient location during procedure: procedure area  Reason for block: post-op pain management and at surgeon's request  Start time: 11/14/2024 9:11 AM  End time: 11/14/2024 9:19 AM  Staffing  Performed: anesthesiologist   Anesthesiologist: Jaswinder Manzano MD  Performed by: Jaswinder Manzano MD  Authorized by: Jaswinder Manzano MD    Preanesthetic Checklist  Completed: patient identified, IV checked, site marked, risks and benefits discussed, surgical/procedural consents, equipment checked, pre-op evaluation, timeout performed, anesthesia consent given, oxygen available, monitors applied/VS acknowledged, fire risk safety assessment completed and verbalized and blood product R/B/A discussed and consented  Peripheral Block   Patient position: supine  Prep: ChloraPrep  Provider prep: mask  Patient monitoring: cardiac monitor, continuous pulse ox, frequent blood pressure checks, IV access, oxygen and responsive to questions  Block type: Sciatic  Popliteal  Laterality: left  Injection technique: single-shot  Guidance: Doppler guided and nerve stimulator    Needle   Needle type: insulated echogenic nerve stimulator needle   Needle gauge: 21 G  Needle length: 10 cm  Assessment   Injection assessment: negative aspiration for heme, no paresthesia on injection, local visualized surrounding nerve on ultrasound and no intravascular symptoms  Paresthesia pain: none  Slow fractionated injection: yes  Hemodynamics: stable  Outcomes: uncomplicated    Additional Notes  Time out 0910   Left adductor canal nb w us 0.25% bupi 25 ml  Left popliteal sciatic nb w us to 0.4 mA 30 ml 0.5% bupi  Medications Administered  BUPivacaine (MARCAINE) PF injection 0.5% - Perineural   30 mL - 11/14/2024 9:11:00 AM  BUPivacaine (MARCAINE) PF injection 0.25% - Perineural   25 mL - 11/14/2024 9:11:00 AM

## 2024-11-14 NOTE — DISCHARGE INSTRUCTIONS
[facility]   [address]  [place], [place] [address]  [phone]    Dr. Jigar Rhodes                                             Post Operative Instructions    1.  Have Prescriptions filled and take as directed.  All medications should be taken with food or milk.    2.  Keep foot elevated six inches above the level of the heart.  Support feet, legs, and knees with pillows.    3.  KEEP FOOT ELEVATED AS MUCH AS POSSIBLE UNTIL YOUR NEXT VISIT    4.  Place an ice pack on the bandaged site for 15 minutes every hour while awake    5.  Keep dressing clean, dry, and intact.  DO NOT REMOVE DRESSING.  CALL THE OFFICE IF BANDAGE COMES OFF.    6.  For the first 7 days after surgery, take temperature by mouth three times a day.  Call the office if greater than 101F.    7.  Ambulate with non-weight bearing to the left lower extremity with crutches / walker.    8.  All instructions are to be followed until otherwise instructed by your surgeon.    9.  Call our office if you have any concerns or questions which arise.  Our phones are answered 24 hours a day.  [phone]    10.  Your first post-operative appointment is scheduled, call the office for appointment date and time if not known prior to today.        ORTHOPEDIC/PODIATRY DISCHARGE INSTRUCTIONS    Follow your surgeons instructions.  Make follow-up appointment.  Observe operative area for signs of excessive bleeding such as a slow general ooze that saturates the dressing or bright red bleeding. In either case, apply pressure to the area and elevate if possible and call your surgeon right away.  Observe the affected extremity for circulation or nerve impairment such as a change in color, numbness, tingling, coldness or increased pain. If any of these symptoms are present call your surgeon.  Observe operative site for any signs of infection such as increased pain, redness, fever greater than 101 degrees, swelling, foul odor or  drainage. Contact surgeon if any of these symptoms are present.  If you become short of breath call your surgeon or go to the nearest emergency room.  Remove dressing if directed by surgeon. Leave steristips or sutures or staples in place.  You may loosen your ace wrap if it feels too tight, or if you have severe pain, or if it has swelling.  Elevate extremity as directed by surgeon.  You may shower when directed by surgeon.  Use ice pack as directed by surgeon. Do not use heat.  Avoid stress to suture line such as pulling, pushing or tugging.  Use crutches as directed by surgeon  Take medications as ordered.Take pain medication with food.Do not drive or operate machinery while taking narcotics.  Call your surgeon for any questions or problems.       ANESTHESIA DISCHARGE INSTRUCTIONS    Wear your seatbelt home.  You are under the influence of drugs-do not drink alcohol,drive,operate machinery,or make any important decisions or sign any legal documentsfor 24 hours  A responsible adult needs to be with you for 24 hours.  You may experience lightheadedness,dizziness,or sleepiness following surgery.  Rest at home today- increase activity as tolerated.  Progress slowly to a regular diet unless your physician has instructed you otherwise.Drink plenty of water.  If nausea becomes a problem call your physician.  Coughing,sore throat,and muscle aches are other side effects of anesthesia,and should improve with time.  Do not drive,operate machinery while taking narcotics.

## 2024-11-14 NOTE — ANESTHESIA PRE PROCEDURE
Temp:  97.5 °F (36.4 °C)    TempSrc:  Oral    SpO2:  100% 100%   Weight: 86.2 kg (190 lb) 86.2 kg (190 lb)    Height: 1.626 m (5' 4\") 1.626 m (5' 4\")                                               BP Readings from Last 3 Encounters:   11/14/24 (!) 132/93   10/06/22 (!) 141/92   10/10/19 118/68       NPO Status: Time of last liquid consumption: 2000                        Time of last solid consumption: 1800                        Date of last liquid consumption: 11/13/24                        Date of last solid food consumption: 11/13/24    BMI:   Wt Readings from Last 3 Encounters:   11/14/24 86.2 kg (190 lb)   10/06/22 92.1 kg (203 lb)     Body mass index is 32.61 kg/m².    CBC: No results found for: \"WBC\", \"RBC\", \"HGB\", \"HCT\", \"MCV\", \"RDW\", \"PLT\"    CMP: No results found for: \"NA\", \"K\", \"CL\", \"CO2\", \"BUN\", \"CREATININE\", \"GFRAA\", \"AGRATIO\", \"LABGLOM\", \"GLUCOSE\", \"GLU\", \"CALCIUM\", \"BILITOT\", \"ALKPHOS\", \"AST\", \"ALT\"    POC Tests: No results for input(s): \"POCGLU\", \"POCNA\", \"POCK\", \"POCCL\", \"POCBUN\", \"POCHEMO\", \"POCHCT\" in the last 72 hours.    Coags: No results found for: \"PROTIME\", \"INR\", \"APTT\"    HCG (If Applicable): No results found for: \"PREGTESTUR\", \"PREGSERUM\", \"HCG\", \"HCGQUANT\"     ABGs: No results found for: \"PHART\", \"PO2ART\", \"BPR1KVO\", \"LHI2DCW\", \"BEART\", \"X7UBDGKV\"     Type & Screen (If Applicable):  No results found for: \"ABORH\", \"LABANTI\"    Drug/Infectious Status (If Applicable):  No results found for: \"HIV\", \"HEPCAB\"    COVID-19 Screening (If Applicable): No results found for: \"COVID19\"        Anesthesia Evaluation     history of anesthetic complications:   Airway: Mallampati: II  TM distance: >3 FB   Neck ROM: full  Mouth opening: > = 3 FB   Dental:          Pulmonary:                              Cardiovascular:    (+) hypertension:, CAD:        Rhythm: regular  Rate: normal                    Neuro/Psych:   (+) psychiatric history:            GI/Hepatic/Renal:   (+) GERD:          Endo/Other:

## 2024-11-14 NOTE — H&P
Date of Surgery Update:  Rose Mary Perez was seen, history and physical examination reviewed, and patient examined by me today. There have been no significant clinical changes since the completion of the previous history and physical.    The risk, benefits, and alternatives of the proposed procedure have been explained to the patient (or appropriate guardian) and understanding verbalized. All questions answered. Patient wishes to proceed.    Electronically signed by: Jigar Rhodes DPM,11/14/2024,8:59 AM

## 2025-01-07 RX ORDER — ACETAMINOPHEN 160 MG
2000 TABLET,DISINTEGRATING ORAL DAILY
COMMUNITY

## 2025-01-07 RX ORDER — ATORVASTATIN CALCIUM 80 MG/1
80 TABLET, FILM COATED ORAL NIGHTLY
COMMUNITY
Start: 2024-08-26 | End: 2025-08-26

## 2025-01-08 NOTE — PROGRESS NOTES
Called patient this morning and informed her of time change for surgery for 1/9 to 0909 and arrival of 0709. She verbalized understanding.

## 2025-01-09 ENCOUNTER — ANESTHESIA EVENT (OUTPATIENT)
Dept: OPERATING ROOM | Age: 55
End: 2025-01-09
Payer: COMMERCIAL

## 2025-01-09 ENCOUNTER — HOSPITAL ENCOUNTER (OUTPATIENT)
Age: 55
Setting detail: OUTPATIENT SURGERY
Discharge: HOME | End: 2025-01-09
Attending: PODIATRIST | Admitting: PODIATRIST
Payer: COMMERCIAL

## 2025-01-09 ENCOUNTER — ANESTHESIA (OUTPATIENT)
Dept: OPERATING ROOM | Age: 55
End: 2025-01-09
Payer: COMMERCIAL

## 2025-01-09 VITALS
TEMPERATURE: 97.8 F | WEIGHT: 188 LBS | BODY MASS INDEX: 32.1 KG/M2 | HEIGHT: 64 IN | HEART RATE: 65 BPM | SYSTOLIC BLOOD PRESSURE: 130 MMHG | DIASTOLIC BLOOD PRESSURE: 72 MMHG | OXYGEN SATURATION: 100 % | RESPIRATION RATE: 13 BRPM

## 2025-01-09 PROCEDURE — 2500000003 HC RX 250 WO HCPCS: Performed by: NURSE ANESTHETIST, CERTIFIED REGISTERED

## 2025-01-09 PROCEDURE — 6360000002 HC RX W HCPCS: Performed by: PODIATRIST

## 2025-01-09 PROCEDURE — 3600000014 HC SURGERY LEVEL 4 ADDTL 15MIN: Performed by: PODIATRIST

## 2025-01-09 PROCEDURE — 6360000002 HC RX W HCPCS: Performed by: NURSE ANESTHETIST, CERTIFIED REGISTERED

## 2025-01-09 PROCEDURE — 7100000010 HC PHASE II RECOVERY - FIRST 15 MIN: Performed by: PODIATRIST

## 2025-01-09 PROCEDURE — 7100000001 HC PACU RECOVERY - ADDTL 15 MIN: Performed by: PODIATRIST

## 2025-01-09 PROCEDURE — 7100000000 HC PACU RECOVERY - FIRST 15 MIN: Performed by: PODIATRIST

## 2025-01-09 PROCEDURE — 2709999900 HC NON-CHARGEABLE SUPPLY: Performed by: PODIATRIST

## 2025-01-09 PROCEDURE — 3600000004 HC SURGERY LEVEL 4 BASE: Performed by: PODIATRIST

## 2025-01-09 PROCEDURE — C1713 ANCHOR/SCREW BN/BN,TIS/BN: HCPCS | Performed by: PODIATRIST

## 2025-01-09 PROCEDURE — 3700000000 HC ANESTHESIA ATTENDED CARE: Performed by: PODIATRIST

## 2025-01-09 PROCEDURE — 7100000011 HC PHASE II RECOVERY - ADDTL 15 MIN: Performed by: PODIATRIST

## 2025-01-09 PROCEDURE — C1776 JOINT DEVICE (IMPLANTABLE): HCPCS | Performed by: PODIATRIST

## 2025-01-09 PROCEDURE — 2720000010 HC SURG SUPPLY STERILE: Performed by: PODIATRIST

## 2025-01-09 PROCEDURE — 3700000001 HC ADD 15 MINUTES (ANESTHESIA): Performed by: PODIATRIST

## 2025-01-09 PROCEDURE — 6370000000 HC RX 637 (ALT 250 FOR IP): Performed by: ANESTHESIOLOGY

## 2025-01-09 PROCEDURE — 6360000002 HC RX W HCPCS: Performed by: ANESTHESIOLOGY

## 2025-01-09 PROCEDURE — 2580000003 HC RX 258: Performed by: PODIATRIST

## 2025-01-09 DEVICE — CANNULATED STRAIGHT
Type: IMPLANTABLE DEVICE | Site: FOOT | Status: FUNCTIONAL
Brand: PHALINX

## 2025-01-09 RX ORDER — GLYCOPYRROLATE 0.2 MG/ML
INJECTION INTRAMUSCULAR; INTRAVENOUS
Status: DISCONTINUED | OUTPATIENT
Start: 2025-01-09 | End: 2025-01-09 | Stop reason: SDUPTHER

## 2025-01-09 RX ORDER — HYDROMORPHONE HYDROCHLORIDE 2 MG/ML
0.25 INJECTION, SOLUTION INTRAMUSCULAR; INTRAVENOUS; SUBCUTANEOUS EVERY 5 MIN PRN
Status: DISCONTINUED | OUTPATIENT
Start: 2025-01-09 | End: 2025-01-09 | Stop reason: HOSPADM

## 2025-01-09 RX ORDER — PROCHLORPERAZINE EDISYLATE 5 MG/ML
5 INJECTION INTRAMUSCULAR; INTRAVENOUS
Status: DISCONTINUED | OUTPATIENT
Start: 2025-01-09 | End: 2025-01-09 | Stop reason: HOSPADM

## 2025-01-09 RX ORDER — HYDROMORPHONE HYDROCHLORIDE 2 MG/ML
0.5 INJECTION, SOLUTION INTRAMUSCULAR; INTRAVENOUS; SUBCUTANEOUS EVERY 5 MIN PRN
Status: DISCONTINUED | OUTPATIENT
Start: 2025-01-09 | End: 2025-01-09 | Stop reason: HOSPADM

## 2025-01-09 RX ORDER — SODIUM CHLORIDE, SODIUM LACTATE, POTASSIUM CHLORIDE, CALCIUM CHLORIDE 600; 310; 30; 20 MG/100ML; MG/100ML; MG/100ML; MG/100ML
INJECTION, SOLUTION INTRAVENOUS CONTINUOUS
Status: DISCONTINUED | OUTPATIENT
Start: 2025-01-09 | End: 2025-01-09

## 2025-01-09 RX ORDER — PROPOFOL 10 MG/ML
INJECTION, EMULSION INTRAVENOUS
Status: DISCONTINUED | OUTPATIENT
Start: 2025-01-09 | End: 2025-01-09 | Stop reason: SDUPTHER

## 2025-01-09 RX ORDER — OXYCODONE HYDROCHLORIDE 5 MG/1
10 TABLET ORAL PRN
Status: DISCONTINUED | OUTPATIENT
Start: 2025-01-09 | End: 2025-01-09 | Stop reason: HOSPADM

## 2025-01-09 RX ORDER — ONDANSETRON 2 MG/ML
INJECTION INTRAMUSCULAR; INTRAVENOUS
Status: DISCONTINUED | OUTPATIENT
Start: 2025-01-09 | End: 2025-01-09 | Stop reason: SDUPTHER

## 2025-01-09 RX ORDER — CEFAZOLIN SODIUM 1 G/3ML
INJECTION, POWDER, FOR SOLUTION INTRAMUSCULAR; INTRAVENOUS
Status: DISCONTINUED | OUTPATIENT
Start: 2025-01-09 | End: 2025-01-09 | Stop reason: SDUPTHER

## 2025-01-09 RX ORDER — SODIUM CHLORIDE 9 MG/ML
INJECTION, SOLUTION INTRAVENOUS PRN
Status: DISCONTINUED | OUTPATIENT
Start: 2025-01-09 | End: 2025-01-09 | Stop reason: HOSPADM

## 2025-01-09 RX ORDER — SUCCINYLCHOLINE/SOD CL,ISO/PF 200MG/10ML
SYRINGE (ML) INTRAVENOUS
Status: DISCONTINUED | OUTPATIENT
Start: 2025-01-09 | End: 2025-01-09 | Stop reason: SDUPTHER

## 2025-01-09 RX ORDER — BUPIVACAINE HYDROCHLORIDE 5 MG/ML
INJECTION, SOLUTION EPIDURAL; INTRACAUDAL
Status: COMPLETED | OUTPATIENT
Start: 2025-01-09 | End: 2025-01-09

## 2025-01-09 RX ORDER — LIDOCAINE HYDROCHLORIDE 10 MG/ML
0.5 INJECTION, SOLUTION EPIDURAL; INFILTRATION; INTRACAUDAL; PERINEURAL ONCE
Status: DISCONTINUED | OUTPATIENT
Start: 2025-01-09 | End: 2025-01-09 | Stop reason: HOSPADM

## 2025-01-09 RX ORDER — ONDANSETRON 2 MG/ML
4 INJECTION INTRAMUSCULAR; INTRAVENOUS
Status: DISCONTINUED | OUTPATIENT
Start: 2025-01-09 | End: 2025-01-09 | Stop reason: HOSPADM

## 2025-01-09 RX ORDER — APREPITANT 40 MG/1
40 CAPSULE ORAL ONCE
Status: COMPLETED | OUTPATIENT
Start: 2025-01-09 | End: 2025-01-09

## 2025-01-09 RX ORDER — ACETAMINOPHEN 500 MG
1000 TABLET ORAL ONCE
Status: COMPLETED | OUTPATIENT
Start: 2025-01-09 | End: 2025-01-09

## 2025-01-09 RX ORDER — SODIUM CHLORIDE 0.9 % (FLUSH) 0.9 %
5-40 SYRINGE (ML) INJECTION EVERY 12 HOURS SCHEDULED
Status: DISCONTINUED | OUTPATIENT
Start: 2025-01-09 | End: 2025-01-09 | Stop reason: HOSPADM

## 2025-01-09 RX ORDER — FENTANYL CITRATE 50 UG/ML
INJECTION, SOLUTION INTRAMUSCULAR; INTRAVENOUS
Status: DISCONTINUED | OUTPATIENT
Start: 2025-01-09 | End: 2025-01-09 | Stop reason: SDUPTHER

## 2025-01-09 RX ORDER — NALOXONE HYDROCHLORIDE 0.4 MG/ML
INJECTION, SOLUTION INTRAMUSCULAR; INTRAVENOUS; SUBCUTANEOUS PRN
Status: DISCONTINUED | OUTPATIENT
Start: 2025-01-09 | End: 2025-01-09 | Stop reason: HOSPADM

## 2025-01-09 RX ORDER — LIDOCAINE HYDROCHLORIDE 10 MG/ML
INJECTION, SOLUTION EPIDURAL; INFILTRATION; INTRACAUDAL; PERINEURAL
Status: COMPLETED | OUTPATIENT
Start: 2025-01-09 | End: 2025-01-09

## 2025-01-09 RX ORDER — TRAMADOL HYDROCHLORIDE 50 MG/1
100 TABLET ORAL ONCE
Status: COMPLETED | OUTPATIENT
Start: 2025-01-09 | End: 2025-01-09

## 2025-01-09 RX ORDER — MIDAZOLAM HYDROCHLORIDE 1 MG/ML
INJECTION, SOLUTION INTRAMUSCULAR; INTRAVENOUS
Status: DISCONTINUED | OUTPATIENT
Start: 2025-01-09 | End: 2025-01-09 | Stop reason: SDUPTHER

## 2025-01-09 RX ORDER — MEPERIDINE HYDROCHLORIDE 25 MG/ML
12.5 INJECTION INTRAMUSCULAR; INTRAVENOUS; SUBCUTANEOUS EVERY 5 MIN PRN
Status: DISCONTINUED | OUTPATIENT
Start: 2025-01-09 | End: 2025-01-09 | Stop reason: HOSPADM

## 2025-01-09 RX ORDER — LIDOCAINE HYDROCHLORIDE 20 MG/ML
INJECTION, SOLUTION INFILTRATION; PERINEURAL
Status: DISCONTINUED | OUTPATIENT
Start: 2025-01-09 | End: 2025-01-09 | Stop reason: SDUPTHER

## 2025-01-09 RX ORDER — IPRATROPIUM BROMIDE AND ALBUTEROL SULFATE 2.5; .5 MG/3ML; MG/3ML
1 SOLUTION RESPIRATORY (INHALATION)
Status: DISCONTINUED | OUTPATIENT
Start: 2025-01-09 | End: 2025-01-09 | Stop reason: HOSPADM

## 2025-01-09 RX ORDER — SCOPOLAMINE 1 MG/3D
1 PATCH, EXTENDED RELEASE TRANSDERMAL
Status: DISCONTINUED | OUTPATIENT
Start: 2025-01-09 | End: 2025-01-09 | Stop reason: HOSPADM

## 2025-01-09 RX ORDER — SODIUM CHLORIDE 9 MG/ML
INJECTION, SOLUTION INTRAVENOUS CONTINUOUS
Status: DISCONTINUED | OUTPATIENT
Start: 2025-01-09 | End: 2025-01-09 | Stop reason: HOSPADM

## 2025-01-09 RX ORDER — OXYCODONE HYDROCHLORIDE 5 MG/1
5 TABLET ORAL
Status: DISCONTINUED | OUTPATIENT
Start: 2025-01-09 | End: 2025-01-09 | Stop reason: HOSPADM

## 2025-01-09 RX ORDER — SODIUM CHLORIDE 0.9 % (FLUSH) 0.9 %
5-40 SYRINGE (ML) INJECTION PRN
Status: DISCONTINUED | OUTPATIENT
Start: 2025-01-09 | End: 2025-01-09 | Stop reason: HOSPADM

## 2025-01-09 RX ADMIN — FENTANYL CITRATE 100 MCG: 50 INJECTION, SOLUTION INTRAMUSCULAR; INTRAVENOUS at 09:21

## 2025-01-09 RX ADMIN — ACETAMINOPHEN 1000 MG: 500 TABLET ORAL at 08:32

## 2025-01-09 RX ADMIN — MIDAZOLAM 2 MG: 1 INJECTION INTRAMUSCULAR; INTRAVENOUS at 09:16

## 2025-01-09 RX ADMIN — CEFAZOLIN 2 G: 1 INJECTION, POWDER, FOR SOLUTION INTRAVENOUS at 09:21

## 2025-01-09 RX ADMIN — SODIUM CHLORIDE: 9 INJECTION, SOLUTION INTRAVENOUS at 07:59

## 2025-01-09 RX ADMIN — PROPOFOL 160 MG: 10 INJECTION, EMULSION INTRAVENOUS at 09:21

## 2025-01-09 RX ADMIN — ONDANSETRON 4 MG: 2 INJECTION INTRAMUSCULAR; INTRAVENOUS at 09:25

## 2025-01-09 RX ADMIN — Medication 140 MG: at 09:22

## 2025-01-09 RX ADMIN — LIDOCAINE HYDROCHLORIDE 100 MG: 20 INJECTION, SOLUTION INFILTRATION; PERINEURAL at 09:21

## 2025-01-09 RX ADMIN — TRAMADOL HYDROCHLORIDE 100 MG: 50 TABLET ORAL at 08:33

## 2025-01-09 RX ADMIN — OXYCODONE 5 MG: 5 TABLET ORAL at 10:52

## 2025-01-09 RX ADMIN — GLYCOPYRROLATE 0.2 MG: 0.2 INJECTION, SOLUTION INTRAMUSCULAR; INTRAVENOUS at 09:37

## 2025-01-09 RX ADMIN — APREPITANT 40 MG: 40 CAPSULE ORAL at 08:33

## 2025-01-09 ASSESSMENT — PAIN SCALES - GENERAL
PAINLEVEL_OUTOF10: 1
PAINLEVEL_OUTOF10: 4

## 2025-01-09 ASSESSMENT — PAIN DESCRIPTION - ORIENTATION
ORIENTATION: LEFT
ORIENTATION: LEFT

## 2025-01-09 ASSESSMENT — PAIN DESCRIPTION - LOCATION
LOCATION: TOE (COMMENT WHICH ONE)
LOCATION: FOOT

## 2025-01-09 ASSESSMENT — PAIN DESCRIPTION - DESCRIPTORS
DESCRIPTORS: TENDER
DESCRIPTORS: SORE

## 2025-01-09 ASSESSMENT — PAIN DESCRIPTION - PAIN TYPE: TYPE: ACUTE PAIN

## 2025-01-09 ASSESSMENT — PAIN - FUNCTIONAL ASSESSMENT
PAIN_FUNCTIONAL_ASSESSMENT: 0-10
PAIN_FUNCTIONAL_ASSESSMENT: 0-10
PAIN_FUNCTIONAL_ASSESSMENT: NONE - DENIES PAIN

## 2025-01-09 ASSESSMENT — PAIN DESCRIPTION - FREQUENCY: FREQUENCY: CONTINUOUS

## 2025-01-09 NOTE — H&P
Date of Surgery Update:  Rose Mary Perez was seen, history and physical examination reviewed, and patient examined by me today. There have been no significant clinical changes since the completion of the previous history and physical.    The risk, benefits, and alternatives of the proposed procedure have been explained to the patient (or appropriate guardian) and understanding verbalized. All questions answered. Patient wishes to proceed.    Electronically signed by: Jigar Rhodes DPM,1/9/2025,7:32 AM

## 2025-01-09 NOTE — BRIEF OP NOTE
Brief Postoperative Note      Patient: Rose Mary Perez  YOB: 1970  MRN: [id number]    Date of Procedure: 1/9/2025    Pre-Op Diagnosis Codes:      * Hammer toe of left foot [M20.42]    Post-Op Diagnosis: Same       Procedure(s):  -25804 (2x) HAMMERTOE REPAIR OF TOES TWO AND THREE-LEFT FOOT-VICKIE  -19642 FLEXOR TENOTOMY SECOND DIGIT LEFT  -32133 USE OF INTRA-OPERATIVE FLUOROSCOPY    Surgeon(s):  Jigar Rhodes DPM    Assistant:  Surgical Assistant: Petrona Agudelo  Resident: Iban West, PGY-3    Anesthesia: General    Hemostasis: anatomic dissection and electrocautery, pneumatic calf tourniquet at 250mmHg for 41 minutes    Injectables: Pre-Op 10 cc of 1% Lidocaine plain and Post-Op 10 cc of 0.5 % Marcaine plain    Materials: 3-0 Vicryl, 4-0 Nylon    Implants:   -(1x) 0 degree medium Phalinx hammer toe implant      Estimated Blood Loss (mL): less than 50     Complications: None    Specimens:   * No specimens in log *    Implants:  Implant Name Type Inv. Item Serial No.  Lot No. LRB No. Used Action   IMPL TOE JT 0 DEG MED LAXMI STR STRL PHALINX - [id number]  IMPL TOE JT 0 DEG MED LAXMI STR STRL PHALINX  [employer]  Left 1 Implanted         Drains: * No LDAs found *    Findings:  Infection Present At Time Of Surgery (PATOS) (choose all levels that have infection present):  No infection present  Other Findings: Second digit which were normal with correction of hammertoe deformity achieved by arthrodesis and flexor tenotomy.  Implant is stable.    Iban West DPM  Chief Podiatric Resident PGY-3  Pager [phone] or Perfect Serve

## 2025-01-09 NOTE — ANESTHESIA PRE PROCEDURE
Department of Anesthesiology  Preprocedure Note       Name:  Rose Mary Perez   Age:  54 y.o.  :  1970                                          MRN:  [id number]         Date:  2025      Surgeon: Surgeon(s):  Jigar Rhodes DPM    Procedure: Procedure(s):  HAMMERTOE REPAIR OF TOES TWO AND THREE-LEFT FOOT-POPLITEAL BLOCK, SAPHENOUS-VICKIE    Medications prior to admission:   Prior to Admission medications    Medication Sig Start Date End Date Taking? Authorizing Provider   vitamin D (VITAMIN D3) 50 MCG (2000) CAPS capsule Take 1 capsule by mouth daily   Yes ProviderAnne Marie MD   atorvastatin (LIPITOR) 80 MG tablet Take 1 tablet by mouth nightly 24 Yes ProviderAnne Marie MD   carvedilol (COREG) 6.25 MG tablet Take 1 tablet by mouth 2 times daily (with meals)   Yes Provider, MD Anne Marie   estradiol (ESTRACE) 0.5 MG tablet Take 2 tablets by mouth daily   Yes ProviderAnne Marie MD   buPROPion (WELLBUTRIN SR) 150 MG extended release tablet TAKE 1 TABLET BY MOUTH EVERY DAY IN THE MORNING 9/3/19  Yes ProviderAnne Marie MD   citalopram (CELEXA) 40 MG tablet TAKE 1 TABLET BY MOUTH EVERY DAY 9/3/19  Yes ProviderAnne Marie MD   omeprazole (PRILOSEC) 10 MG delayed release capsule Take 1 capsule by mouth as needed   Yes Provider, MD Anne Marie   aspirin 81 MG chewable tablet Take 1 tablet by mouth daily    ProviderAnne Marie MD       Current medications:    Current Facility-Administered Medications   Medication Dose Route Frequency Provider Last Rate Last Admin   • lidocaine PF 1 % injection 0.5 mL  0.5 mL IntraDERmal Once Jigar Rhodes DPM       • 0.9 % sodium chloride infusion   IntraVENous Continuous Jigar Rhodes  mL/hr at 25 0759 New Bag at 25 0759       Allergies:    Allergies   Allergen Reactions   • Latex Rash     Also occurs with bandaids   • Penicillins Rash, Other (See Comments) and Shortness Of Breath     Can take keflex   • Ace

## 2025-01-09 NOTE — PROGRESS NOTES
Discharge instructions reviewed with patient/responsible adult. All home medications have been reviewed, questions answered and patient and family verbalized understanding.  Discharge instructions signed and copies given. Patient discharged  per w/c with belongings.

## 2025-01-09 NOTE — H&P
Date of Surgery Update:    Rose Mary Perez  was seen, history and physical examination reviewed, and patient examined by me today. There have been no significant clinical changes since the completion of the previous history and physical.     The nature of the procedure, possible complications, alternative forms of therapy, post-op course, and post-op goals have been explained to patient (or appropriate guardian) and patient's family and understanding was verbalized.  All questions have been answered. The consent has been signed.  Patient's surgical site has been marked. Patient wishes to proceed with surgery.     Discussed with KUSUM Fenton DPM  Podiatric Resident PGY-3  Pager [phone] or Perfect Serve

## 2025-01-09 NOTE — DISCHARGE INSTRUCTIONS
[facility]   [address]  [place], [place] [address] [phone]    Dr. Jigar Rhodes                                             Post Operative Instructions    1.  Have Prescriptions filled and take as directed.  All medications should be taken with food or milk.    2.  Keep foot elevated six inches above the level of the heart.  Support feet, legs, and knees with pillows.    3.  KEEP FOOT ELEVATED AS MUCH AS POSSIBLE UNTIL YOUR NEXT VISIT    4.  Place an ice pack on the bandaged site for 15 minutes every hour while awake    5.  Keep dressing clean, dry, and intact.  DO NOT REMOVE DRESSING.  CALL THE OFFICE IF BANDAGE COMES OFF.    6.  For the first 7 days after surgery, take temperature by mouth three times a day.  Call the office if greater than 101F.    7.  PARTIAL WEIGHTBEARING WITH HEEL TOUCH TO LLE ONLY    8.  All instructions are to be followed until otherwise instructed by your surgeon.    9.  Call our office if you have any concerns or questions which arise.  Our phones are answered 24 hours a day.  [phone]    10.  Your first post-operative appointment is scheduled, call the office for appointment date and time if not known prior to today.        ORTHOPEDIC/PODIATRY DISCHARGE INSTRUCTIONS    Follow your surgeons instructions.  Make follow-up appointment.  Observe operative area for signs of excessive bleeding such as a slow general ooze that saturates the dressing or bright red bleeding. In either case, apply pressure to the area and elevate if possible and call your surgeon right away.  Observe the affected extremity for circulation or nerve impairment such as a change in color, numbness, tingling, coldness or increased pain. If any of these symptoms are present call your surgeon.  Observe operative site for any signs of infection such as increased pain, redness, fever greater than 101 degrees, swelling, foul odor or drainage. Contact surgeon if any of

## 2025-01-09 NOTE — PROGRESS NOTES
Received from OR - Drowsy,nasal cannula @ 3 liters 98%,left foot dressing/ace dry and intact ,pins x 2 intact,circulation good, walking boot on ,elevated ,ice pack placed, kamran,vss

## 2025-01-09 NOTE — ANESTHESIA POSTPROCEDURE EVALUATION
Department of Anesthesiology  Postprocedure Note    Patient: Rose Mary Perez  MRN: [id number]  YOB: 1970  Date of evaluation: 1/9/2025    Procedure Summary       Date: 01/09/25 Room / Location: [address]    Anesthesia Start: 0917 Anesthesia Stop: 1024    Procedure: HAMMERTOE REPAIR OF TOES TWO AND THREE-LEFT FOOT-VICKIE (Left: Foot) Diagnosis:       Hammer toe of left foot      (Hammer toe of left foot [M20.42])    Surgeons: Jigar Rhodes DPM Responsible Provider: Bairon Lee MD    Anesthesia Type: general ASA Status: 3            Anesthesia Type: No value filed.    Helen Phase I: Helen Score: 10    Helen Phase II: Helen Score: 10    Anesthesia Post Evaluation    Patient location during evaluation: PACU  Patient participation: complete - patient participated  Level of consciousness: awake and alert  Airway patency: patent  Nausea & Vomiting: no nausea and no vomiting  Cardiovascular status: hemodynamically stable  Respiratory status: acceptable  Hydration status: euvolemic  Pain management: adequate    No notable events documented.

## 2025-01-10 NOTE — OP NOTE
to the digit at the level of the distal interphalangeal joint. Excellent correction of the digital contracture was noted. The stab incision was then closed using 4-0 nylon in a simple interrupted suture technique.      The surgical site was then irrigated with sterile saline. The extensor tendon was repaired with 3-0 vicryl. The skin was then reapproximated with 4-0 nylon in a simple interrupted fashion.    PROCEDURE #3: 54099 HAMMERTOE REPAIR OF THIRD TOE: At this time, attention was directed towards the dorsal aspect of the patients third digit. Using a # 15 blade an incision was performed over the distal interphalangeal joint. The incision was deepened down through the subcutaneous tissues using sharp and blunt dissection techniques. All vital neurovascular structures were carefully retracted and all venous tributaries were cauterized as encountered. Dissection continued down to the level of the extensor digitorum longus tendon and capsule of the distal interphalangeal joint of the third digit. A transverse tenotomy and capsulotomy was created and the medial and lateral collateral ligaments were freed over the head of the middle phalanx of the third digit. This tendon was reflected back for proper visualization of the arthroplasty site.  Attention was directed towards the dorsal, medial, and lateral aspects of the patient's distal phalangeal base and the collateral ligaments and extensor tendon was freed from the base and reflected. At this time, a sagittal saw was utilized to transect the the head of the middle phalanx just proximal to the epicondyles from dorsal to plantar, medial to lateral, and through and through. The transected bone was extubated in total from the surgical site after being freed from all soft tissue attachments. Next, the k-wire from the implant set was driven from the distal phalanx out the tip of the toe to the black laser line.  The K-wire was advanced through the middle phalanx and into  dressing clean, dry and intact at all times. The patient is to call with if any complications occur.    Dictated on behalf of KUSUM Fenton DPM  Chief Podiatric Resident PGY-3  Pager [phone] or Perfect Serve

## 2025-04-23 RX ORDER — HYDROCODONE BITARTRATE AND ACETAMINOPHEN 5; 325 MG/1; MG/1
1 TABLET ORAL EVERY 6 HOURS PRN
COMMUNITY

## 2025-04-23 NOTE — PROGRESS NOTES
DATE 5/1/2025____ PLACE ____  [address]       TIME 0920____                                   _xxxxxx___  [address]      ARRIVAL TIME 0720..                                          SURGEONS OFFICE TO GIVE ARRIVAL TIME ___                                    IF YOU HAVE NOT RECEIVED A TIME CONTACT YOUR SURGEON                                     THE FOLLOWING THINGS NEED TO BE DONE OR YOUR SURGERY WILL BE CANCELLED    NOTHING TO EAT OR DRINK AFTER MIDNIGHT THE NIGHT BEFORE. YOU MAY TAKE ONLY THE FOLLOWING MEDICATIONS WITH A SIP OF WATER ON THE MORNING OF YOUR SURGERY.  _carvedilol, omeprazole, pain pill as needed_____________________________________________________________________________________________YOU MAY BRUSH YOUR TEETH.    2.   A HISTORY/PHYSICAL MUST BE COMPLETED AND DATED WITHIN 30 DAYS OF YOUR SURGERY BY YOUR PCP xxxxx__                                                                                                                                                                                                 SURGEON __                                                                                                                                                                                                 HOSPITALIST __    3.  THE FOLLOWING MUST BE DONE WITHIN 30 DAYS OF SURGERY. LAB __  EKG __ CHEST XRAY __ TO BE DONE BY ____  NOT APPICABLE________    4.   IF YOU TAKE A LONG ACTING INSULIN AT NIGHT, CUT YOUR NORMAL DOSE IN HALF, AND TAKE NO DIABETIC MEDCATION IN THE MORNING.    5.   IF YOU TAKE A GLP-1 RECEPTOR (SUCH AS TRULICITY, MOUNJARO, OZEMPIC-WEEKLY), YOU MUST BE OFF x 7 DAYS. IF YOU TAKE A DAILY DOSE SUCH AS RYBELSUS,      YOU MUST STOP 24 HOURS PRIOR TO SURGERY. LAST DOSE__n/a______    6.  IF YOU TAKE A SGLT2 INHIBITOR  (SUCH AS FARXIGA, JARDIANCE, INVOKANA OR SYNJARDY), YOU MUST STOP 3 DAYS PRIOR TO SURGERY. LAST DOSE_n/a______    7.  IF YOU TAKE A BLOOD THINNER (SUCH AS PLAVIX,  ELIQUIS, XARELTO, WARFARIN) OR AN ANTIPLATELET SUCH AS PLETAL. MAKE SURE YOU GET INSTUCTIONS FROM YOUR SURGEON AND PRESCRIBING DRZackary AS TO WHEN IT NEEDS TO BE STOPPED. LAST DOSE_7 days prior asa_____    8.  IF YOU TAKE ANY MEDICATIONS FOR WEIGHT LOSS (SUCH AS ADIPEX, NALTREXONE, PHENTERMINE) YOU MUST STOP X 3 DAYS. LAST DOSE_n/a_____    9.  ASPIRIN, IBUPROFEN, ADVIL, ANTI INFLAMMATORIES, ALONG WITH VITAMINS AND SUPPLEMENTS SHOULD BE STOPPED FOR 5-7 DAYS. TYLENOL IS OK TO TAKE.    10. YOU MUST HAVE A RESPONSIBLE ADULT, AGE 18 OR OLDER TO STAY WITH YOU AND DRIVE YOU HOME. A PARENT OR LEGAL GUARDIAN MUST STAY ON SITE THE ENTIRE TIME A CHILD IS HERE.    11. FOLLOW YOUR SURGEONS INSTRUCTIONS FOR SHOWERING PRIOR TO SURGERY.    12. IF YOU HAVE NOT BEEN GIVEN SPECIFIC INSTRUCTIONS, SHOWER THE MORNING OF WITH AN ANTIBACTERIAL SOAP, SUCH AS DIAL.    13. FOLLOW YOUR SURGEONS INSTRUCTIONS IF THEY HAVE GIVEN YOU A BOWEL PREP TO DO BEFORE SURGERY.    14. IF YOU DEVELOP AN ILLNESS PRIOR TO SURGERY LET YOUR SURGEON KNOW (COUGH , FEVER, COLD, SORE THROAT,  NAUSEA OR VOMITING)    15. IF YOU HAVE ANY SKIN BREAKDOWN, SIGN OF INFECTION OR DENTAL ISSUES CONTACT YOUR SURGEON.    16. IF WE HAVE NOT REACHED YOU TO GIVE SPECIFIC INSTRUCTIONS ON WHAT MEDICATIONS TO TAKE THE MORNING OF SURGERY YOU MAY TAKE YOUR HEART, BLOOD PRESSURE, THYROID,SEIZURE MEDICATIONS AND USE YOUR INHALERS. DO NOT TAKE BLOOD PRESSURE MEDICATIONS ENDING IN \"PRIL\" or \"SARTAN\" THE MORNING OF OR IRMA PRIOR TO SURGERY.                                                                               GENERAL INSTRUCTIONS     Surgery dates, times and arrival times are subject to change. Please check with your surgeon. MAKE SURE YOUR VOICEMAIL IS SET UP AND NOT FULL so you are able to receive messages regarding your surgery.  Bring a photo ID,insurance cards and form of payment. There is a pharmacy on site.  Bring a complete list of your medications, including vitamins and

## 2025-05-01 ENCOUNTER — HOSPITAL ENCOUNTER (OUTPATIENT)
Age: 55
Setting detail: OUTPATIENT SURGERY
Discharge: HOME | End: 2025-05-01
Attending: PODIATRIST | Admitting: PODIATRIST
Payer: COMMERCIAL

## 2025-05-01 ENCOUNTER — ANESTHESIA EVENT (OUTPATIENT)
Dept: OPERATING ROOM | Age: 55
End: 2025-05-01
Payer: COMMERCIAL

## 2025-05-01 ENCOUNTER — ANESTHESIA (OUTPATIENT)
Dept: OPERATING ROOM | Age: 55
End: 2025-05-01
Payer: COMMERCIAL

## 2025-05-01 VITALS
SYSTOLIC BLOOD PRESSURE: 140 MMHG | OXYGEN SATURATION: 95 % | BODY MASS INDEX: 32.61 KG/M2 | DIASTOLIC BLOOD PRESSURE: 83 MMHG | HEART RATE: 76 BPM | WEIGHT: 191 LBS | RESPIRATION RATE: 12 BRPM | HEIGHT: 64 IN | TEMPERATURE: 97.8 F

## 2025-05-01 PROCEDURE — 7100000010 HC PHASE II RECOVERY - FIRST 15 MIN: Performed by: PODIATRIST

## 2025-05-01 PROCEDURE — 2580000003 HC RX 258: Performed by: PODIATRIST

## 2025-05-01 PROCEDURE — 6360000002 HC RX W HCPCS

## 2025-05-01 PROCEDURE — 2500000003 HC RX 250 WO HCPCS: Performed by: PODIATRIST

## 2025-05-01 PROCEDURE — 6370000000 HC RX 637 (ALT 250 FOR IP): Performed by: ANESTHESIOLOGY

## 2025-05-01 PROCEDURE — 2500000003 HC RX 250 WO HCPCS

## 2025-05-01 PROCEDURE — 64445 NJX AA&/STRD SCIATIC NRV IMG: CPT | Performed by: ANESTHESIOLOGY

## 2025-05-01 PROCEDURE — C1713 ANCHOR/SCREW BN/BN,TIS/BN: HCPCS | Performed by: PODIATRIST

## 2025-05-01 PROCEDURE — 7100000001 HC PACU RECOVERY - ADDTL 15 MIN: Performed by: PODIATRIST

## 2025-05-01 PROCEDURE — 2709999900 HC NON-CHARGEABLE SUPPLY: Performed by: PODIATRIST

## 2025-05-01 PROCEDURE — 3700000001 HC ADD 15 MINUTES (ANESTHESIA): Performed by: PODIATRIST

## 2025-05-01 PROCEDURE — 64447 NJX AA&/STRD FEMORAL NRV IMG: CPT | Performed by: ANESTHESIOLOGY

## 2025-05-01 PROCEDURE — 7100000000 HC PACU RECOVERY - FIRST 15 MIN: Performed by: PODIATRIST

## 2025-05-01 PROCEDURE — 3600000004 HC SURGERY LEVEL 4 BASE: Performed by: PODIATRIST

## 2025-05-01 PROCEDURE — 3700000000 HC ANESTHESIA ATTENDED CARE: Performed by: PODIATRIST

## 2025-05-01 PROCEDURE — 6360000002 HC RX W HCPCS: Performed by: ANESTHESIOLOGY

## 2025-05-01 PROCEDURE — C1763 CONN TISS, NON-HUMAN: HCPCS | Performed by: PODIATRIST

## 2025-05-01 PROCEDURE — 6360000002 HC RX W HCPCS: Performed by: PODIATRIST

## 2025-05-01 PROCEDURE — 7100000011 HC PHASE II RECOVERY - ADDTL 15 MIN: Performed by: PODIATRIST

## 2025-05-01 PROCEDURE — 3600000014 HC SURGERY LEVEL 4 ADDTL 15MIN: Performed by: PODIATRIST

## 2025-05-01 PROCEDURE — 2720000010 HC SURG SUPPLY STERILE: Performed by: PODIATRIST

## 2025-05-01 DEVICE — IMPLANTABLE DEVICE: Type: IMPLANTABLE DEVICE | Site: FOOT | Status: FUNCTIONAL

## 2025-05-01 DEVICE — WASHER: Type: IMPLANTABLE DEVICE | Site: FOOT | Status: FUNCTIONAL

## 2025-05-01 DEVICE — CANNULATED SCREW - 16MM THREAD
Type: IMPLANTABLE DEVICE | Site: FOOT | Status: FUNCTIONAL
Brand: AXSOS 3

## 2025-05-01 DEVICE — GRAFT BNE SUB 5CC VIABLE MTRX COMPRESSIBLE MOLD RDY TO USE: Type: IMPLANTABLE DEVICE | Site: FOOT | Status: FUNCTIONAL

## 2025-05-01 RX ORDER — GLYCOPYRROLATE 0.2 MG/ML
INJECTION INTRAMUSCULAR; INTRAVENOUS
Status: DISCONTINUED | OUTPATIENT
Start: 2025-05-01 | End: 2025-05-01 | Stop reason: SDUPTHER

## 2025-05-01 RX ORDER — HYDROMORPHONE HYDROCHLORIDE 2 MG/ML
0.5 INJECTION, SOLUTION INTRAMUSCULAR; INTRAVENOUS; SUBCUTANEOUS EVERY 5 MIN PRN
Status: DISCONTINUED | OUTPATIENT
Start: 2025-05-01 | End: 2025-05-01 | Stop reason: HOSPADM

## 2025-05-01 RX ORDER — SODIUM CHLORIDE 0.9 % (FLUSH) 0.9 %
5-40 SYRINGE (ML) INJECTION PRN
Status: DISCONTINUED | OUTPATIENT
Start: 2025-05-01 | End: 2025-05-01 | Stop reason: HOSPADM

## 2025-05-01 RX ORDER — SODIUM CHLORIDE 0.9 % (FLUSH) 0.9 %
5-40 SYRINGE (ML) INJECTION EVERY 12 HOURS SCHEDULED
Status: DISCONTINUED | OUTPATIENT
Start: 2025-05-01 | End: 2025-05-01 | Stop reason: HOSPADM

## 2025-05-01 RX ORDER — ONDANSETRON 2 MG/ML
4 INJECTION INTRAMUSCULAR; INTRAVENOUS
Status: DISCONTINUED | OUTPATIENT
Start: 2025-05-01 | End: 2025-05-01 | Stop reason: HOSPADM

## 2025-05-01 RX ORDER — FENTANYL CITRATE 50 UG/ML
100 INJECTION, SOLUTION INTRAMUSCULAR; INTRAVENOUS ONCE
Status: COMPLETED | OUTPATIENT
Start: 2025-05-01 | End: 2025-05-01

## 2025-05-01 RX ORDER — ROPIVACAINE HYDROCHLORIDE 5 MG/ML
INJECTION, SOLUTION EPIDURAL; INFILTRATION; PERINEURAL
Status: DISCONTINUED | OUTPATIENT
Start: 2025-05-01 | End: 2025-05-01 | Stop reason: SDUPTHER

## 2025-05-01 RX ORDER — DEXAMETHASONE SODIUM PHOSPHATE 4 MG/ML
INJECTION, SOLUTION INTRA-ARTICULAR; INTRALESIONAL; INTRAMUSCULAR; INTRAVENOUS; SOFT TISSUE
Status: DISCONTINUED | OUTPATIENT
Start: 2025-05-01 | End: 2025-05-01 | Stop reason: SDUPTHER

## 2025-05-01 RX ORDER — HYDRALAZINE HYDROCHLORIDE 20 MG/ML
10 INJECTION INTRAMUSCULAR; INTRAVENOUS
Status: DISCONTINUED | OUTPATIENT
Start: 2025-05-01 | End: 2025-05-01 | Stop reason: HOSPADM

## 2025-05-01 RX ORDER — SODIUM CHLORIDE 9 MG/ML
INJECTION, SOLUTION INTRAVENOUS PRN
Status: DISCONTINUED | OUTPATIENT
Start: 2025-05-01 | End: 2025-05-01 | Stop reason: HOSPADM

## 2025-05-01 RX ORDER — PROCHLORPERAZINE EDISYLATE 5 MG/ML
5 INJECTION INTRAMUSCULAR; INTRAVENOUS
Status: DISCONTINUED | OUTPATIENT
Start: 2025-05-01 | End: 2025-05-01 | Stop reason: HOSPADM

## 2025-05-01 RX ORDER — ONDANSETRON 2 MG/ML
INJECTION INTRAMUSCULAR; INTRAVENOUS
Status: DISCONTINUED | OUTPATIENT
Start: 2025-05-01 | End: 2025-05-01 | Stop reason: SDUPTHER

## 2025-05-01 RX ORDER — APREPITANT 40 MG/1
40 CAPSULE ORAL ONCE
Status: COMPLETED | OUTPATIENT
Start: 2025-05-01 | End: 2025-05-01

## 2025-05-01 RX ORDER — SODIUM CHLORIDE 9 MG/ML
INJECTION, SOLUTION INTRAVENOUS CONTINUOUS
Status: DISCONTINUED | OUTPATIENT
Start: 2025-05-01 | End: 2025-05-01 | Stop reason: HOSPADM

## 2025-05-01 RX ORDER — NALOXONE HYDROCHLORIDE 0.4 MG/ML
INJECTION, SOLUTION INTRAMUSCULAR; INTRAVENOUS; SUBCUTANEOUS PRN
Status: DISCONTINUED | OUTPATIENT
Start: 2025-05-01 | End: 2025-05-01 | Stop reason: HOSPADM

## 2025-05-01 RX ORDER — SCOPOLAMINE 1 MG/3D
1 PATCH, EXTENDED RELEASE TRANSDERMAL ONCE
Status: DISCONTINUED | OUTPATIENT
Start: 2025-05-01 | End: 2025-05-01 | Stop reason: HOSPADM

## 2025-05-01 RX ORDER — ROCURONIUM BROMIDE 10 MG/ML
INJECTION, SOLUTION INTRAVENOUS
Status: DISCONTINUED | OUTPATIENT
Start: 2025-05-01 | End: 2025-05-01 | Stop reason: SDUPTHER

## 2025-05-01 RX ORDER — LIDOCAINE HYDROCHLORIDE 10 MG/ML
0.5 INJECTION, SOLUTION EPIDURAL; INFILTRATION; INTRACAUDAL; PERINEURAL ONCE
Status: DISCONTINUED | OUTPATIENT
Start: 2025-05-01 | End: 2025-05-01 | Stop reason: HOSPADM

## 2025-05-01 RX ORDER — OXYCODONE HYDROCHLORIDE 5 MG/1
10 TABLET ORAL PRN
Status: DISCONTINUED | OUTPATIENT
Start: 2025-05-01 | End: 2025-05-01 | Stop reason: HOSPADM

## 2025-05-01 RX ORDER — PROPOFOL 10 MG/ML
INJECTION, EMULSION INTRAVENOUS
Status: DISCONTINUED | OUTPATIENT
Start: 2025-05-01 | End: 2025-05-01 | Stop reason: SDUPTHER

## 2025-05-01 RX ORDER — LABETALOL HYDROCHLORIDE 5 MG/ML
10 INJECTION, SOLUTION INTRAVENOUS
Status: DISCONTINUED | OUTPATIENT
Start: 2025-05-01 | End: 2025-05-01 | Stop reason: HOSPADM

## 2025-05-01 RX ORDER — LIDOCAINE HYDROCHLORIDE 20 MG/ML
INJECTION, SOLUTION INFILTRATION; PERINEURAL
Status: DISCONTINUED | OUTPATIENT
Start: 2025-05-01 | End: 2025-05-01 | Stop reason: SDUPTHER

## 2025-05-01 RX ORDER — FENTANYL CITRATE 50 UG/ML
25 INJECTION, SOLUTION INTRAMUSCULAR; INTRAVENOUS EVERY 5 MIN PRN
Status: DISCONTINUED | OUTPATIENT
Start: 2025-05-01 | End: 2025-05-01 | Stop reason: HOSPADM

## 2025-05-01 RX ORDER — MIDAZOLAM HYDROCHLORIDE 2 MG/2ML
2 INJECTION, SOLUTION INTRAMUSCULAR; INTRAVENOUS ONCE
Status: COMPLETED | OUTPATIENT
Start: 2025-05-01 | End: 2025-05-01

## 2025-05-01 RX ORDER — OXYCODONE HYDROCHLORIDE 5 MG/1
5 TABLET ORAL PRN
Status: DISCONTINUED | OUTPATIENT
Start: 2025-05-01 | End: 2025-05-01 | Stop reason: HOSPADM

## 2025-05-01 RX ORDER — SODIUM CHLORIDE, SODIUM LACTATE, POTASSIUM CHLORIDE, CALCIUM CHLORIDE 600; 310; 30; 20 MG/100ML; MG/100ML; MG/100ML; MG/100ML
INJECTION, SOLUTION INTRAVENOUS CONTINUOUS
Status: DISCONTINUED | OUTPATIENT
Start: 2025-05-01 | End: 2025-05-01 | Stop reason: HOSPADM

## 2025-05-01 RX ADMIN — PROPOFOL 30 MG: 10 INJECTION, EMULSION INTRAVENOUS at 09:26

## 2025-05-01 RX ADMIN — GLYCOPYRROLATE 0.2 MG: 0.2 INJECTION INTRAMUSCULAR; INTRAVENOUS at 09:24

## 2025-05-01 RX ADMIN — SUGAMMADEX 200 MG: 100 INJECTION, SOLUTION INTRAVENOUS at 11:19

## 2025-05-01 RX ADMIN — ROCURONIUM BROMIDE 10 MG: 10 INJECTION, SOLUTION INTRAVENOUS at 10:16

## 2025-05-01 RX ADMIN — MIDAZOLAM 2 MG: 1 INJECTION INTRAMUSCULAR; INTRAVENOUS at 08:13

## 2025-05-01 RX ADMIN — DEXAMETHASONE SODIUM PHOSPHATE 4 MG: 4 INJECTION, SOLUTION INTRAMUSCULAR; INTRAVENOUS at 09:30

## 2025-05-01 RX ADMIN — APREPITANT 40 MG: 40 CAPSULE ORAL at 08:07

## 2025-05-01 RX ADMIN — PROPOFOL 40 MG: 10 INJECTION, EMULSION INTRAVENOUS at 09:24

## 2025-05-01 RX ADMIN — PROPOFOL 50 MG: 10 INJECTION, EMULSION INTRAVENOUS at 11:19

## 2025-05-01 RX ADMIN — CEFAZOLIN 2000 MG: 2 INJECTION, POWDER, FOR SOLUTION INTRAMUSCULAR; INTRAVENOUS at 09:29

## 2025-05-01 RX ADMIN — SODIUM CHLORIDE: 0.9 INJECTION, SOLUTION INTRAVENOUS at 07:50

## 2025-05-01 RX ADMIN — FENTANYL CITRATE 100 MCG: 50 INJECTION INTRAMUSCULAR; INTRAVENOUS at 08:13

## 2025-05-01 RX ADMIN — ROPIVACAINE HYDROCHLORIDE 20 MG: 5 INJECTION, SOLUTION EPIDURAL; INFILTRATION; PERINEURAL at 08:21

## 2025-05-01 RX ADMIN — LIDOCAINE HYDROCHLORIDE 100 MG: 20 INJECTION, SOLUTION INFILTRATION; PERINEURAL at 09:23

## 2025-05-01 RX ADMIN — ONDANSETRON 4 MG: 2 INJECTION INTRAMUSCULAR; INTRAVENOUS at 10:16

## 2025-05-01 RX ADMIN — ROPIVACAINE HYDROCHLORIDE 40 MG: 5 INJECTION, SOLUTION EPIDURAL; INFILTRATION; PERINEURAL at 08:18

## 2025-05-01 RX ADMIN — PROPOFOL 80 MG: 10 INJECTION, EMULSION INTRAVENOUS at 09:23

## 2025-05-01 RX ADMIN — ROCURONIUM BROMIDE 50 MG: 10 INJECTION, SOLUTION INTRAVENOUS at 09:24

## 2025-05-01 RX ADMIN — ROCURONIUM BROMIDE 10 MG: 10 INJECTION, SOLUTION INTRAVENOUS at 11:00

## 2025-05-01 ASSESSMENT — PAIN DESCRIPTION - LOCATION: LOCATION: ANKLE

## 2025-05-01 ASSESSMENT — PAIN - FUNCTIONAL ASSESSMENT
PAIN_FUNCTIONAL_ASSESSMENT: PREVENTS OR INTERFERES SOME ACTIVE ACTIVITIES AND ADLS
PAIN_FUNCTIONAL_ASSESSMENT: 0-10
PAIN_FUNCTIONAL_ASSESSMENT: NONE - DENIES PAIN

## 2025-05-01 ASSESSMENT — PAIN DESCRIPTION - DESCRIPTORS
DESCRIPTORS: ACHING
DESCRIPTORS: ACHING

## 2025-05-01 ASSESSMENT — PAIN SCALES - GENERAL: PAINLEVEL_OUTOF10: 5

## 2025-05-01 ASSESSMENT — PAIN DESCRIPTION - ORIENTATION: ORIENTATION: LEFT

## 2025-05-01 NOTE — ANESTHESIA PRE PROCEDURE
Department of Anesthesiology  Preprocedure Note       Name:  Rose Mary Perez   Age:  54 y.o.  :  1970                                          MRN:  [id number]         Date:  2025      Surgeon: Surgeon(s):  Jigar Rhodes DPM    Procedure: Procedure(s):  TALAR OSTECTOMY-LEFT FOOT  SUBTALAR JOINT ARTHRODESIS-LEFT FOOT-APPLICATION OF BELOW KNEE SPLINT-LEFT LOWR LIMB    Medications prior to admission:   Prior to Admission medications    Medication Sig Start Date End Date Taking? Authorizing Provider   vitamin D (VITAMIN D3) 50 MCG (2000) CAPS capsule Take 1 capsule by mouth daily   Yes ProviderAnne Marie MD   atorvastatin (LIPITOR) 80 MG tablet Take 1 tablet by mouth nightly 24 Yes ProviderAnne Marie MD   carvedilol (COREG) 6.25 MG tablet Take 1 tablet by mouth 2 times daily (with meals)   Yes ProviderAnne Marie MD   estradiol (ESTRACE) 0.5 MG tablet Take 2 tablets by mouth daily   Yes ProviderAnne Marie MD   citalopram (CELEXA) 40 MG tablet TAKE 1 TABLET BY MOUTH EVERY DAY 9/3/19  Yes ProviderAnne Marie MD   omeprazole (PRILOSEC) 10 MG delayed release capsule Take 1 capsule by mouth as needed   Yes ProviderAnne Marie MD   HYDROcodone-acetaminophen (NORCO) 5-325 MG per tablet Take 1 tablet by mouth every 6 hours as needed for Pain.    ProviderAnne Marie MD   aspirin 81 MG chewable tablet Take 1 tablet by mouth daily    ProviderAnne Marie MD       Current medications:    Current Facility-Administered Medications   Medication Dose Route Frequency Provider Last Rate Last Admin    lactated ringers infusion   IntraVENous Continuous Jigar Rhodes DPM        lidocaine PF 1 % injection 0.5 mL  0.5 mL IntraDERmal Once Jigar Rhodes DPM        ceFAZolin (ANCEF) 2,000 mg in sterile water 20 mL IV syringe  2,000 mg IntraVENous On Call to OR Jigar Rhodes DPM        0.9 % sodium chloride infusion   IntraVENous Continuous Jigar Rhodes  mL/hr at

## 2025-05-01 NOTE — PROGRESS NOTES
Blocks completed. Pt tolerated well. O2 sat remained % throughout. Post procedure P= 66  BP= 134/88

## 2025-05-01 NOTE — ANESTHESIA POSTPROCEDURE EVALUATION
Department of Anesthesiology  Postprocedure Note    Patient: Rose Mary Perez  MRN: [id number]  YOB: 1970  Date of evaluation: 5/1/2025    Procedure Summary       Date: 05/01/25 Room / Location: [address]    Anesthesia Start: 0917 Anesthesia Stop: 1141    Procedures:       TALAR OSTECTOMY-LEFT FOOT (Left: Foot)      SUBTALAR JOINT ARTHRODESIS-LEFT FOOT-APPLICATION OF BELOW KNEE SPLINT-LEFT LOWR LIMB (Left: Ankle) Diagnosis:       Osteophyte of left foot      Pseudarthrosis after joint fusion      (Osteophyte of left foot [M25.775])      (Pseudarthrosis after joint fusion [M96.0])    Surgeons: Jigar Rhodes DPM Responsible Provider: Chelita Mcconnell DO    Anesthesia Type: general, regional ASA Status: 3            Anesthesia Type: No value filed.    Helen Phase I: Helen Score: 10    Helen Phase II: Helen Score: 10    Anesthesia Post Evaluation    Patient location during evaluation: PACU  Patient participation: complete - patient participated  Level of consciousness: awake and alert  Pain score: 0  Airway patency: patent  Nausea & Vomiting: no nausea and no vomiting  Cardiovascular status: blood pressure returned to baseline  Respiratory status: room air and nonlabored ventilation  Hydration status: euvolemic  Multimodal analgesia pain management approach  Pain management: adequate    No notable events documented.

## 2025-05-01 NOTE — OP NOTE
Operative Note      Patient: Rose Mary Perez  YOB: 1970  MRN: [id number]    Date of Procedure: 5/1/2025    Pre-Op Diagnosis Codes:      * Osteophyte of left foot [M25.775]     * Pseudarthrosis after joint fusion [M96.0]     * Painful retained hardware [M96.6]      * Obesity [E66.9]    Post-Op Diagnosis: Same       Procedure(s):  20680 - REMOVAL OF PAINFUL RETAINED HARDWARE-LEFT FOOT  28120 - TALAR OSTECTOMY-LEFT FOOT  93970 - SUBTALAR JOINT ARTHRODESIS-LEFT FOOT  22777 - APPLICATION OF BELOW KNEE SPLINT-LEFT LOWR LIMB  18177 - USE OF INTRA-OPERATIVE FLUOROSCOPY     Surgeon(s):  Jigar Rhodes DPM     Assistant:  Residents: Iban West, PGY-3 and Narciso Monroe PGY-1     Anesthesia: General with popliteal block     Hemostasis: anatomic dissection and electrocautery, pneumatic calf tourniquet at 250mmHg for 113 minutes     Injectables: Pre-Op 5 cc of 1% Lidocaine with epinephrine     Materials: 3-0/4-0/5-0 Vicryl, 4-0 Nylon     Implants:   -(1x) 2.5 cc of Bio4 viable bone matrix  -(1x) 5 cc of Bio4 viable bone matrix  -(1x) 6.5 x 80mm Axsos cannulated screw with washer  -(1x) 6.5 x 85mm Axsos cannulated screw with washer     Estimated Blood Loss (mL): Minimal     Complications: None    Specimens:   * No specimens in log *    Implants:  Implant Name Type Inv. Item Serial No.  Lot No. LRB No. Used Action   GRAFT BNE SUB 5CC VIABLE MTRX COMPRESSIBLE MOLD RDY TO USE - [id number]  GRAFT BNE SUB 5CC VIABLE MTRX COMPRESSIBLE MOLD RDY TO USE [id number] [employer] [email or web address]  Left 1 Implanted   GRAFT BNE 2.5CC VIABLE BNE MTRX COMPRESSIBLE MOLD RDY TO - [id number]  GRAFT BNE 2.5CC VIABLE BNE MTRX COMPRESSIBLE MOLD RDY TO [id number] [email or web address]- [id number] Left 1 Implanted   SCREW BNE LAXMI 6.5X80 MM 16 MM THRD AXSOS 3 - [id number]  SCREW BNE LAXMI 6.5X80 MM 16 MM THRD AXSOS 3  [employer]  Left 1 Implanted   WASHER ORTH TI FOR ASNS III 6.5/8MM LAXMI [facility] - [id number]   curved awl was also used to prep the nonfused portion of the joint.  Next, 7.5 cc of Bio4 viable bone matrix was packed into the joint to aid in the osseous fusion.    Next, the subtalar joint was positioned in a neutral position and fixated by placing one large diameter 6.5 x 80mm Axsos cannulated screw with washer  Medical screw from the calcaneus to the talus perpendicular to the posterior facet of the subtalar joint using modified AO technique and the manufacturers recommended insertion technique. Next, to provide rotational stability to the subtalar joint, another large diameter 6.5 x 85mm Axsos cannulated screw with washer  Medical screw was placed from the calcaneus to the talar neck] perpendicular to the anterior facet of the subtalar joint using modified AO technique and the manufacturers recommended insertion technique. Proper position of the fixation was confirmed using live intraoperative fluoroscopy.  Care was taken to place the screws in new holes compared to previous fixation.  The wound was then gently flushed with normal saline solution taking care to avoid removing any of the previously packed graft.  Intraoperative fluoroscopy was utilized in multiple views to confirm position of the subtalar joint screws.    The deep fascia covering the extensor digitorum brevis muscle belly was then re-approximated using 3-0 vicryl. Care was taken to avoid the sural nerve and peroneal tendons in this layer. Next, subcutaneous tissue was closed using 4 -0 Vicryl. Skin was closed using 5 -0 Vicryl.     PROCEDURE #4: 80357 - APPLICATION OF BELOW KNEE SPLINT-LEFT LOWR LIMB: A soft dry sterile dressing was applied.  The pneumatic calf tourniquet was rapidly deflated and there is a prompt hyperemic response on the left lower extremity.  Next, copious amounts of cast padding was applied to the patient's left lower extremity from the metatarsal heads to approximately 3 finger breaths distal to the head and neck of the

## 2025-05-01 NOTE — PROGRESS NOTES
Monitors applied. Baseline VS = 140/86  P- 66  O2 sat= 99% with 2L via NC.  Time out completed per protocol prior to left popliteal and adductor canal blocks .  Medicated with 100mcg Fentanyl and 2mg Versed as instructed by anesthesia. See MAR

## 2025-05-01 NOTE — H&P
Date of Surgery Update:  Rose Mary Perez was seen, history and physical examination reviewed, and patient examined by me today. There have been no significant clinical changes since the completion of the previous history and physical.    The risk, benefits, and alternatives of the proposed procedure have been explained to the patient (or appropriate guardian) and understanding verbalized. All questions answered. Patient wishes to proceed.    Electronically signed by: Jigar Rhodes DPM,5/1/2025,8:45 AM

## 2025-05-01 NOTE — DISCHARGE INSTRUCTIONS
[facility]   [address]  [place], [place] [address]  [phone]    Dr. Jigar Rhodes                                             Post Operative Instructions    1.  Have Prescriptions filled and take as directed.  All medications should be taken with food or milk.    2.  Keep foot elevated six inches above the level of the heart.  Support feet, legs, and knees with pillows.    3.  KEEP FOOT ELEVATED AS MUCH AS POSSIBLE UNTIL YOUR NEXT VISIT    4.  Place an ice pack on the bandaged site for 15 minutes every hour while awake    5.  Keep dressing clean, dry, and intact.  DO NOT REMOVE DRESSING.  CALL THE OFFICE IF BANDAGE COMES OFF.    6.  For the first 7 days after surgery, take temperature by mouth three times a day.  Call the office if greater than 101F.    7.  NON-WEIGHTBEARING TO LLE    8.  All instructions are to be followed until otherwise instructed by your surgeon.    9.  Call our office if you have any concerns or questions which arise.  Our phones are answered 24 hours a day.  [phone]    10.  Your first post-operative appointment is scheduled, call the office for appointment date and time if not known prior to today.      ORTHOPEDIC/PODIATRY DISCHARGE INSTRUCTIONS    Follow your surgeons instructions.  Make follow-up appointment.  Observe operative area for signs of excessive bleeding such as a slow general ooze that saturates the dressing or bright red bleeding. In either case, apply pressure to the area and elevate if possible and call your surgeon right away.  Observe the affected extremity for circulation or nerve impairment such as a change in color, numbness, tingling, coldness or increased pain. If any of these symptoms are present call your surgeon.  Observe operative site for any signs of infection such as increased pain, redness, fever greater than 101 degrees, swelling, foul odor or drainage. Contact surgeon if any of these symptoms are  present.  If you become short of breath call your surgeon or go to the nearest emergency room.  Remove dressing if directed by surgeon. Leave steristips or sutures or staples in place.  You may loosen your ace wrap if it feels too tight, or if you have severe pain, or if it has swelling.  Elevate extremity as directed by surgeon.  You may shower when directed by surgeon.  Use ice pack as directed by surgeon. Do not use heat.  Avoid stress to suture line such as pulling, pushing or tugging.  Use crutches as directed by surgeon  Take medications as ordered.Take pain medication with food.Do not drive or operate machinery while taking narcotics.  Call your surgeon for any questions or problems.      ANESTHESIA DISCHARGE INSTRUCTIONS    Wear your seatbelt home.  You are under the influence of drugs-do not drink alcohol, drive, operate machinery, make any important decisions or sign any legal documents for 24 hours.  Children should not ride bikes, skate boards or play on gym sets for 24 hours after surgery.  A responsible adult needs to be with you for 24 hours.  You may experience lightheadedness, dizziness, or sleepiness following surgery.  Rest at home today- increase activity as tolerated.  Progress slowly to a regular diet unless your physician has instructed you otherwise. Drink plenty of water.  If persistent nausea and vomiting becomes a problem, call your physician.  Coughing, sore throat and muscle aches are other side effects of anesthesia, and should improve with time.  Do not drive or operate machinery while taking narcotics.

## 2025-05-01 NOTE — ANESTHESIA PROCEDURE NOTES
Peripheral Block    Patient location during procedure: pre-op  Reason for block: procedure for pain, post-op pain management and at surgeon's request  Start time: 5/1/2025 8:21 AM  End time: 5/1/2025 8:24 AM  Staffing  Performed: anesthesiologist   Performed by: Chelita Mcconnell DO  Authorized by: Chelita Mcconnell DO    Preanesthetic Checklist  Completed: patient identified, IV checked, site marked, risks and benefits discussed, surgical/procedural consents, equipment checked, pre-op evaluation, timeout performed, anesthesia consent given, oxygen available, monitors applied/VS acknowledged, fire risk safety assessment completed and verbalized and blood product R/B/A discussed and consented  Peripheral Block   Patient position: supine  Prep: ChloraPrep  Patient monitoring: cardiac monitor, continuous pulse ox, continuous capnometry, frequent blood pressure checks, IV access and oxygen  Block type: Saphenous  Laterality: left  Injection technique: single-shot  Guidance: ultrasound guided    Needle   Needle gauge: 22 G  Needle localization: anatomical landmarks and ultrasound guidance  Assessment   Injection assessment: negative aspiration for heme, no paresthesia on injection, local visualized surrounding nerve on ultrasound and no intravascular symptoms  Paresthesia pain: none  Slow fractionated injection: yes  Hemodynamics: stable  Outcomes: uncomplicated and patient tolerated procedure well    Additional Notes  Following popliteal block adductor canal block completed. 20 mL 0.5% Ropivacaine injected in 5 mL increments following negative aspiration. Tip of needle in view at all times. No pain or paresthesias on injection. Tip of needle in view at all times. No pain or paresthesias on injection. Pt tolerated the procedure well.

## 2025-05-01 NOTE — BRIEF OP NOTE
Brief Postoperative Note      Patient: Rose Mary Perez  YOB: 1970  MRN: [id number]    Date of Procedure: 5/1/2025    Pre-Op Diagnosis Codes:      * Osteophyte of left foot [M25.775]     * Pseudarthrosis after joint fusion [M96.0]     * Painful retained hardware [M96.6]      * Obesity [E66.9]     Post-Op Diagnosis: Same       Procedure(s):  20680 - REMOVAL OF PAINFUL RETAINED HARDWARE-LEFT FOOT  28120 - TALAR OSTECTOMY-LEFT FOOT  23009 - SUBTALAR JOINT ARTHRODESIS-LEFT FOOT  72075 - APPLICATION OF BELOW KNEE SPLINT-LEFT LOWR LIMB  48570 - USE OF INTRA-OPERATIVE FLUOROSCOPY    Surgeon(s):  iJgar Rhodes DPM    Assistant:  Residents: Iban West, PGY-3 and Narciso Monroe PGY-1    Anesthesia: General with popliteal block    Hemostasis: anatomic dissection and electrocautery, pneumatic calf tourniquet at 250mmHg for 113 minutes    Injectables: Pre-Op 5 cc of 1% Lidocaine with epinephrine    Materials: 3-0/4-0/5-0 Vicryl, 4-0 Nylon    Implants:   -(1x) 2.5 cc of Bio4 viable bone matrix  -(1x) 5 cc of Bio4 viable bone matrix  -(1x) 6.5 x 80mm Axsos cannulated screw with washer  -(1x) 6.5 x 85mm Axsos cannulated screw with washer    Estimated Blood Loss (mL): Minimal    Complications: None    Specimens:   * No specimens in log *    Implants:  Implant Name Type Inv. Item Serial No.  Lot No. LRB No. Used Action   GRAFT BNE SUB 5CC VIABLE MTRX COMPRESSIBLE MOLD RDY TO USE - [id number]  GRAFT BNE SUB 5CC VIABLE MTRX COMPRESSIBLE MOLD RDY TO USE [id number] [employer] [facility]  Left 1 Implanted   GRAFT BNE 2.5CC VIABLE BNE MTRX COMPRESSIBLE MOLD RDY TO - [id number]  GRAFT BNE 2.5CC VIABLE BNE MTRX COMPRESSIBLE MOLD RDY TO [id number] [employer]- [id number] Left 1 Implanted   SCREW BNE LAXMI 6.5X80 MM 16 MM THRD AXSOS 3 - [id number]  SCREW BNE LAXMI 6.5X80 MM 16 MM THRD AXSOS 3  [employer] HOWM[employer]WD  Left 1 Implanted   WASHER ORTH TI FOR ASNS III 6.5/8MM LAXMI [facility] - [id number]

## 2025-05-01 NOTE — ANESTHESIA PROCEDURE NOTES
Peripheral Block    Patient location during procedure: pre-op  Reason for block: procedure for pain, post-op pain management and at surgeon's request  Start time: 5/1/2025 8:18 AM  End time: 5/1/2025 8:21 AM  Staffing  Performed: anesthesiologist   Performed by: Chelita Mcconnell DO  Authorized by: Chelita Mcconnell DO    Preanesthetic Checklist  Completed: patient identified, IV checked, site marked, risks and benefits discussed, surgical/procedural consents, equipment checked, pre-op evaluation, timeout performed, anesthesia consent given, oxygen available, monitors applied/VS acknowledged, fire risk safety assessment completed and verbalized and blood product R/B/A discussed and consented  Peripheral Block   Patient position: supine  Prep: ChloraPrep  Patient monitoring: cardiac monitor, continuous pulse ox, continuous capnometry, frequent blood pressure checks, IV access and oxygen  Block type: Sciatic  Popliteal  Laterality: left  Injection technique: single-shot  Guidance: ultrasound guided    Needle   Needle gauge: 22 G  Needle localization: anatomical landmarks and ultrasound guidance  Assessment   Injection assessment: negative aspiration for heme, no paresthesia on injection, local visualized surrounding nerve on ultrasound and no intravascular symptoms  Paresthesia pain: none  Slow fractionated injection: yes  Hemodynamics: stable  Outcomes: uncomplicated and patient tolerated procedure well    Additional Notes  Sterile prep. Timeout with SDS RN. 2 mg versed + 100 micrograms fentanyl administered IV for pt comfort (see MAR). 40 mL 0.5% Ropivacaine injected in 5 mL increments following negative aspiration. Tip of needle in view at all times. No pain or paresthesias on injection. Pt tolerated the procedure well.

## (undated) DEVICE — SYRINGE MED 10ML LUERLOCK TIP W/O SFTY DISP

## (undated) DEVICE — SUTURE VICRYL + SZ 3-0 L27IN ABSRB UD CT-2 L26MM 1/2 CIR TAPR VCP232H

## (undated) DEVICE — LOWER EXTREMITY: Brand: MEDLINE INDUSTRIES, INC.

## (undated) DEVICE — MAJOR SET UP PK

## (undated) DEVICE — JOINT PREP INSTRUMENT KIT: Brand: ORTHOLOC™ 2

## (undated) DEVICE — SYRINGE, LUER LOCK, 10ML: Brand: MEDLINE

## (undated) DEVICE — ZIMMER® STERILE DISPOSABLE TOURNIQUET CUFF WITH PLC, DUAL PORT, SINGLE BLADDER, 18 IN. (46 CM)

## (undated) DEVICE — DRESSING PETRO W3XL3IN OIL EMUL N ADH GZ KNIT IMPREG CELOS

## (undated) DEVICE — TOWEL,OR,DSP,ST,BLUE,STD,4/PK,20PK/CS: Brand: MEDLINE

## (undated) DEVICE — STOCKINETTE,IMPERVIOUS,12X48,STERILE: Brand: MEDLINE

## (undated) DEVICE — BLADE SAW SS SHORT NARROW 15X7X.4MM

## (undated) DEVICE — PADDING CAST W4INXL4YD ST COT RAYON MICROPLEATED HIGHLY

## (undated) DEVICE — DRESSING,GAUZE,XEROFORM,CURAD,1"X8",ST: Brand: CURAD

## (undated) DEVICE — WIRE FIXATION .045IN 5IN C-WIRE SS SPADE
Type: IMPLANTABLE DEVICE | Site: FOOT | Status: NON-FUNCTIONAL
Removed: 2025-01-09

## (undated) DEVICE — REAMER FOR CROSS-PLATES: Brand: ANCHORAGE

## (undated) DEVICE — WET SKIN PREP TRAY: Brand: MEDLINE INDUSTRIES, INC.

## (undated) DEVICE — SURG GL, SENSICARE PI ORTHO LT,LF,PF,8.5: Brand: MEDLINE

## (undated) DEVICE — GLOVE SURG SZ 8 L12IN FNGR THK79MIL GRN LTX FREE

## (undated) DEVICE — SUTURE VICRYL + SZ 4-0 L27IN ABSRB UD L26MM SH 1/2 CIR VCP415H

## (undated) DEVICE — DRESSING PETRO W3XL8IN N ADH OIL EMUL GZ CURAD

## (undated) DEVICE — SOLUTION IRRIG 500ML 0.9% SOD CHL USP POUR PLAS BTL

## (undated) DEVICE — SUTURE NONABSORBABLE MONOFILAMENT 4-0 PS-2 18 IN BLK ETHILON 1667G

## (undated) DEVICE — STAPLER SKIN H3.9MM WIRE DIA0.58MM CRWN 6.9MM 35 STPL ROT

## (undated) DEVICE — ELECTRODE PT RET AD L9FT HI MOIST COND ADH HYDRGEL CORDED

## (undated) DEVICE — PAD ABSRB W8XL10IN ABD HYDROPHOBIC NONWOVEN THCK LAYR CELOS

## (undated) DEVICE — DRILL BIT, AO DIA2.6MM X 135MM, SCALED: Brand: VARIAX

## (undated) DEVICE — HYPODERMIC SAFETY NEEDLE: Brand: MAGELLAN

## (undated) DEVICE — CRADLE POS 3X5X24IN RASPBERRY ARM PRONE FOAM DISP

## (undated) DEVICE — 3M™ TEGADERM™ TRANSPARENT FILM DRESSING FRAME STYLE, 1626W, 4 IN X 4-3/4 IN (10 CM X 12 CM), 50/CT 4CT/CASE: Brand: 3M™ TEGADERM™

## (undated) DEVICE — SYRINGE MED 30ML STD CLR PLAS LUERLOCK TIP N CTRL DISP

## (undated) DEVICE — GUIDE WIRE

## (undated) DEVICE — PRECISION THIN (7.0 X 0.38 X 15.0MM)

## (undated) DEVICE — INSTRUMENT PACK: Brand: DARTFIRE EDGE

## (undated) DEVICE — SOLUTION IRRIG 500ML 0.9% SOD CHLO USP POUR PLAS BTL

## (undated) DEVICE — HOLDING PIN: Brand: ANCHORAGE

## (undated) DEVICE — INTENDED FOR TISSUE SEPARATION, AND OTHER PROCEDURES THAT REQUIRE A SHARP SURGICAL BLADE TO PUNCTURE OR CUT.: Brand: BARD-PARKER ® STAINLESS STEEL BLADES

## (undated) DEVICE — BANDAGE COMPR W4INXL10YD WHITE/BEIGE E MTRX HK LOOP CLSR

## (undated) DEVICE — SHEET,DRAPE,53X77,STERILE: Brand: MEDLINE

## (undated) DEVICE — SUTURE VCRL + SZ 4-0 L27IN ABSRB UD L26MM SH 1/2 CIR VCP415H

## (undated) DEVICE — SPONGE LAP W18XL18IN WHT COT 4 PLY FLD STRUNG RADPQ DISP ST 2 PER PACK

## (undated) DEVICE — CANNULATED DRILL: Brand: FIXOS

## (undated) DEVICE — LIQUIBAND RAPID ADHESIVE 36/CS 0.8ML: Brand: MEDLINE

## (undated) DEVICE — SUTURE VICRYL + SZ 5 0 L18IN ABSRB UD PS 2 L19MM PRIM REV CUT VCP495G

## (undated) DEVICE — SUTURE VCRL + SZ 3-0 L27IN ABSRB UD CT-2 L26MM 1/2 CIR TAPR VCP232H

## (undated) DEVICE — ZIMMER® STERILE DISPOSABLE TOURNIQUET CUFF WITH PLC, DUAL PORT, SINGLE BLADDER, 34 IN. (86 CM)

## (undated) DEVICE — DRAPE C ARM W54XL85IN MINI WITH POLY STRAP FOR FLUOROSCAN

## (undated) DEVICE — GLOVE SURG SZ 85 L12IN FNGR THK79MIL GRN LTX FREE

## (undated) DEVICE — SPEEDGUIDE DRILL AO: Brand: VARIAX

## (undated) DEVICE — GOWN,REINFORCED,POLY,SIRUS,XLNG/XXLG: Brand: MEDLINE

## (undated) DEVICE — T-DRAPE,EXTREMITY,STERILE: Brand: MEDLINE

## (undated) DEVICE — CURITY NON-ADHERENT STRIPS: Brand: CURITY

## (undated) DEVICE — Device

## (undated) DEVICE — GOWN SIRUS NONREIN XL W/TWL: Brand: MEDLINE INDUSTRIES, INC.

## (undated) DEVICE — GLOVE SURG SZ 85 L12IN FNGR ORTHO 126MIL CRM LTX FREE

## (undated) DEVICE — PRECISION THIN (9.0 X 0.38 X 31.0MM)

## (undated) DEVICE — NEEDLE HYPO 22GA L1.5IN BLK POLYPR HUB S STL REG BVL STR

## (undated) DEVICE — KIT INSTR JOINT PREP ST DISP

## (undated) DEVICE — COVER,TABLE,77X90,STERILE: Brand: MEDLINE

## (undated) DEVICE — C-ARM: Brand: UNBRANDED

## (undated) DEVICE — 3M™ COBAN™ NL STERILE NON-LATEX SELF-ADHERENT WRAP, 2084S, 4 IN X 5 YD (10 CM X 4,5 M), 18 ROLLS/CASE: Brand: 3M™ COBAN™

## (undated) DEVICE — GLOVE ORANGE PI 7 1/2   MSG9075

## (undated) DEVICE — K-WIRE, SMOOTH, SINGLE TIP
Type: IMPLANTABLE DEVICE | Site: FOOT | Status: NON-FUNCTIONAL
Removed: 2025-05-01

## (undated) DEVICE — SUTURE ETHLN SZ 3-0 L18IN NONABSORBABLE BLK PS-2 L19MM 3/8 1669H

## (undated) DEVICE — BIT CAN DRILL 4.9MM/L240MM

## (undated) DEVICE — Device: Brand: PHALINX

## (undated) DEVICE — GLOVE ORANGE PI 8   MSG9080

## (undated) DEVICE — BIT DRL K WIRE 1.6X200 MM 2 MM FOR SCR VARIAX

## (undated) DEVICE — MASTISOL ADHESIVE LIQ 2/3ML

## (undated) DEVICE — PRECISION THIN (9.0 X 0.38 X 25.0MM)

## (undated) DEVICE — GAUZE,SPONGE,4"X4",8PLY,STRL,LF,10/TRAY: Brand: MEDLINE

## (undated) DEVICE — UNTHREADED GUIDE WIRE: Brand: FIXOS

## (undated) DEVICE — GLOVE SURG SZ 75 L12IN FNGR THK79MIL GRN LTX FREE

## (undated) DEVICE — DRILL: Brand: SONICFUSION

## (undated) DEVICE — SUTURE N ABSRB BRAIDED 2-0 CT-3 18 IN 22 MM 1/2 CIR WHT BLU 3910900036

## (undated) DEVICE — SUTURE VCRL + SZ 5 0 L18IN ABSRB UD PS 2 L19MM PRIM REV CUT VCP495G

## (undated) DEVICE — DRAPE EQUIP CARM MINI 85X54 IN W/ELASTIC OPENING

## (undated) DEVICE — BLANKET WRM W29.9XL79.1IN UP BODY FORC AIR MISTRAL-AIR

## (undated) DEVICE — 3M™ STERI-STRIP™ REINFORCED ADHESIVE SKIN CLOSURES, R1546, 1/4 IN X 4 IN (6 MM X 100 MM), 10 STRIPS/ENVELOPE: Brand: 3M™ STERI-STRIP™

## (undated) DEVICE — BANDAGE COBAN 4 IN COMPR W4INXL5YD FOAM COHESIVE QUIK STK SELF ADH SFT

## (undated) DEVICE — PRECISION THIN, OFFSET (5.5 X 0.38 X 25.0MM)

## (undated) DEVICE — BANDAGE COMPR W6INXL12FT SMOOTH FOR LIMB EXSANG ESMARCH

## (undated) DEVICE — GAUZE,SPONGE,4"X4",16PLY,XRAY,STRL,LF: Brand: MEDLINE